# Patient Record
Sex: FEMALE | Race: WHITE | NOT HISPANIC OR LATINO | URBAN - METROPOLITAN AREA
[De-identification: names, ages, dates, MRNs, and addresses within clinical notes are randomized per-mention and may not be internally consistent; named-entity substitution may affect disease eponyms.]

---

## 2023-01-05 ENCOUNTER — EMERGENCY (EMERGENCY)
Facility: HOSPITAL | Age: 88
LOS: 0 days | Discharge: LEFT BEFORE DISCHARGE | End: 2023-01-05
Attending: EMERGENCY MEDICINE | Admitting: EMERGENCY MEDICINE
Payer: MEDICARE

## 2023-01-05 VITALS
TEMPERATURE: 99 F | WEIGHT: 89.95 LBS | OXYGEN SATURATION: 98 % | RESPIRATION RATE: 17 BRPM | DIASTOLIC BLOOD PRESSURE: 66 MMHG | SYSTOLIC BLOOD PRESSURE: 116 MMHG

## 2023-01-05 VITALS
SYSTOLIC BLOOD PRESSURE: 171 MMHG | DIASTOLIC BLOOD PRESSURE: 62 MMHG | HEART RATE: 65 BPM | TEMPERATURE: 98 F | RESPIRATION RATE: 18 BRPM

## 2023-01-05 DIAGNOSIS — Z20.822 CONTACT WITH AND (SUSPECTED) EXPOSURE TO COVID-19: ICD-10-CM

## 2023-01-05 DIAGNOSIS — Z85.51 PERSONAL HISTORY OF MALIGNANT NEOPLASM OF BLADDER: ICD-10-CM

## 2023-01-05 DIAGNOSIS — Z90.49 ACQUIRED ABSENCE OF OTHER SPECIFIED PARTS OF DIGESTIVE TRACT: Chronic | ICD-10-CM

## 2023-01-05 DIAGNOSIS — R30.0 DYSURIA: ICD-10-CM

## 2023-01-05 DIAGNOSIS — Z53.29 PROCEDURE AND TREATMENT NOT CARRIED OUT BECAUSE OF PATIENT'S DECISION FOR OTHER REASONS: ICD-10-CM

## 2023-01-05 DIAGNOSIS — N39.0 URINARY TRACT INFECTION, SITE NOT SPECIFIED: ICD-10-CM

## 2023-01-05 DIAGNOSIS — Z95.2 PRESENCE OF PROSTHETIC HEART VALVE: Chronic | ICD-10-CM

## 2023-01-05 DIAGNOSIS — Z88.0 ALLERGY STATUS TO PENICILLIN: ICD-10-CM

## 2023-01-05 DIAGNOSIS — R35.0 FREQUENCY OF MICTURITION: ICD-10-CM

## 2023-01-05 DIAGNOSIS — N13.2 HYDRONEPHROSIS WITH RENAL AND URETERAL CALCULOUS OBSTRUCTION: ICD-10-CM

## 2023-01-05 DIAGNOSIS — Z95.4 PRESENCE OF OTHER HEART-VALVE REPLACEMENT: ICD-10-CM

## 2023-01-05 LAB
ALBUMIN SERPL ELPH-MCNC: 4.4 G/DL — SIGNIFICANT CHANGE UP (ref 3.5–5.2)
ALP SERPL-CCNC: 84 U/L — SIGNIFICANT CHANGE UP (ref 30–115)
ALT FLD-CCNC: 15 U/L — SIGNIFICANT CHANGE UP (ref 0–41)
ANION GAP SERPL CALC-SCNC: 9 MMOL/L — SIGNIFICANT CHANGE UP (ref 7–14)
APPEARANCE UR: CLEAR — SIGNIFICANT CHANGE UP
AST SERPL-CCNC: 44 U/L — HIGH (ref 0–41)
BACTERIA # UR AUTO: ABNORMAL
BASOPHILS # BLD AUTO: 0.04 K/UL — SIGNIFICANT CHANGE UP (ref 0–0.2)
BASOPHILS NFR BLD AUTO: 0.6 % — SIGNIFICANT CHANGE UP (ref 0–1)
BILIRUB SERPL-MCNC: 0.4 MG/DL — SIGNIFICANT CHANGE UP (ref 0.2–1.2)
BILIRUB UR-MCNC: NEGATIVE — SIGNIFICANT CHANGE UP
BUN SERPL-MCNC: 25 MG/DL — HIGH (ref 10–20)
CALCIUM SERPL-MCNC: 9.8 MG/DL — SIGNIFICANT CHANGE UP (ref 8.4–10.5)
CHLORIDE SERPL-SCNC: 104 MMOL/L — SIGNIFICANT CHANGE UP (ref 98–110)
CO2 SERPL-SCNC: 25 MMOL/L — SIGNIFICANT CHANGE UP (ref 17–32)
COD CRY URNS QL: NEGATIVE — SIGNIFICANT CHANGE UP
COLOR SPEC: YELLOW — SIGNIFICANT CHANGE UP
CREAT SERPL-MCNC: 0.8 MG/DL — SIGNIFICANT CHANGE UP (ref 0.7–1.5)
DIFF PNL FLD: ABNORMAL
EGFR: 66 ML/MIN/1.73M2 — SIGNIFICANT CHANGE UP
EOSINOPHIL # BLD AUTO: 0.01 K/UL — SIGNIFICANT CHANGE UP (ref 0–0.7)
EOSINOPHIL NFR BLD AUTO: 0.1 % — SIGNIFICANT CHANGE UP (ref 0–8)
EPI CELLS # UR: ABNORMAL /HPF
GLUCOSE SERPL-MCNC: 106 MG/DL — HIGH (ref 70–99)
GLUCOSE UR QL: NEGATIVE MG/DL — SIGNIFICANT CHANGE UP
GRAN CASTS # UR COMP ASSIST: NEGATIVE — SIGNIFICANT CHANGE UP
HCT VFR BLD CALC: 39.8 % — SIGNIFICANT CHANGE UP (ref 37–47)
HGB BLD-MCNC: 13.5 G/DL — SIGNIFICANT CHANGE UP (ref 12–16)
HYALINE CASTS # UR AUTO: NEGATIVE — SIGNIFICANT CHANGE UP
IMM GRANULOCYTES NFR BLD AUTO: 0.1 % — SIGNIFICANT CHANGE UP (ref 0.1–0.3)
KETONES UR-MCNC: NEGATIVE — SIGNIFICANT CHANGE UP
LACTATE SERPL-SCNC: 1.3 MMOL/L — SIGNIFICANT CHANGE UP (ref 0.7–2)
LEUKOCYTE ESTERASE UR-ACNC: NEGATIVE — SIGNIFICANT CHANGE UP
LYMPHOCYTES # BLD AUTO: 1.07 K/UL — LOW (ref 1.2–3.4)
LYMPHOCYTES # BLD AUTO: 15.6 % — LOW (ref 20.5–51.1)
MCHC RBC-ENTMCNC: 32.3 PG — HIGH (ref 27–31)
MCHC RBC-ENTMCNC: 33.9 G/DL — SIGNIFICANT CHANGE UP (ref 32–37)
MCV RBC AUTO: 95.2 FL — SIGNIFICANT CHANGE UP (ref 81–99)
MONOCYTES # BLD AUTO: 0.51 K/UL — SIGNIFICANT CHANGE UP (ref 0.1–0.6)
MONOCYTES NFR BLD AUTO: 7.4 % — SIGNIFICANT CHANGE UP (ref 1.7–9.3)
NEUTROPHILS # BLD AUTO: 5.21 K/UL — SIGNIFICANT CHANGE UP (ref 1.4–6.5)
NEUTROPHILS NFR BLD AUTO: 76.2 % — HIGH (ref 42.2–75.2)
NITRITE UR-MCNC: NEGATIVE — SIGNIFICANT CHANGE UP
NRBC # BLD: 0 /100 WBCS — SIGNIFICANT CHANGE UP (ref 0–0)
PH UR: 5.5 — SIGNIFICANT CHANGE UP (ref 5–8)
PLATELET # BLD AUTO: 152 K/UL — SIGNIFICANT CHANGE UP (ref 130–400)
POTASSIUM SERPL-MCNC: 5.6 MMOL/L — HIGH (ref 3.5–5)
POTASSIUM SERPL-SCNC: 5.6 MMOL/L — HIGH (ref 3.5–5)
PROT SERPL-MCNC: 7.3 G/DL — SIGNIFICANT CHANGE UP (ref 6–8)
PROT UR-MCNC: 30 MG/DL
RBC # BLD: 4.18 M/UL — LOW (ref 4.2–5.4)
RBC # FLD: 12.6 % — SIGNIFICANT CHANGE UP (ref 11.5–14.5)
RBC CASTS # UR COMP ASSIST: ABNORMAL /HPF
SARS-COV-2 RNA SPEC QL NAA+PROBE: SIGNIFICANT CHANGE UP
SODIUM SERPL-SCNC: 138 MMOL/L — SIGNIFICANT CHANGE UP (ref 135–146)
SP GR SPEC: >=1.03 (ref 1.01–1.03)
TRI-PHOS CRY UR QL COMP ASSIST: NEGATIVE — SIGNIFICANT CHANGE UP
URATE CRY FLD QL MICRO: NEGATIVE — SIGNIFICANT CHANGE UP
UROBILINOGEN FLD QL: 0.2 MG/DL — SIGNIFICANT CHANGE UP
WBC # BLD: 6.85 K/UL — SIGNIFICANT CHANGE UP (ref 4.8–10.8)
WBC # FLD AUTO: 6.85 K/UL — SIGNIFICANT CHANGE UP (ref 4.8–10.8)
WBC UR QL: ABNORMAL /HPF

## 2023-01-05 PROCEDURE — 99285 EMERGENCY DEPT VISIT HI MDM: CPT

## 2023-01-05 PROCEDURE — 74176 CT ABD & PELVIS W/O CONTRAST: CPT | Mod: 26,MA

## 2023-01-05 RX ORDER — CEFPODOXIME PROXETIL 100 MG
1 TABLET ORAL
Qty: 14 | Refills: 0
Start: 2023-01-05 | End: 2023-01-11

## 2023-01-05 NOTE — ED PROVIDER NOTE - CONSIDERATION OF ADMISSION OBSERVATION
Consideration of Admission/Observation Due to the patient's advanced age with possible UTI and possible renal stone considered admission.

## 2023-01-05 NOTE — ED PROVIDER NOTE - PATIENT PORTAL LINK FT
You can access the FollowMyHealth Patient Portal offered by [facility] by registering at the following website: [email or web address]. By joining [employer]’s FollowMyHealth portal, you will also be able to view your health information using other applications (apps) compatible with our system.

## 2023-01-05 NOTE — ED PROVIDER NOTE - CARE PROVIDERS DIRECT ADDRESSES
[email or web address]

## 2023-01-05 NOTE — ED ADULT NURSE NOTE - OBJECTIVE STATEMENT
as per pts son, "for three weeks shes been having frequent urination and burning. she was placed on two different antibiotics by PMD, I don't think they are working. she has bladder cancer so I don't know if this is just a uti or if something is progressing"

## 2023-01-05 NOTE — ED PROVIDER NOTE - ATTENDING CONTRIBUTION TO CARE
89-year-old female with a known history of bladder cancer not being aggressively treated with VAC placement here evaluation of 3 weeks of urinary frequency dysuria.  Patient was initially treated with nitrofurantoin followed by fosfomycin for presumed UTI which was never actually diagnosed via urine sample rather clinically was diagnosed.  Patient presents with urinary symptoms with any fever chills.  The patient does endorse vague bilateral flank pain agrees above exam  Impression  Patient here with urinary symptoms suggestive of UTI labs showing no elevated white count with urinalysis demonstrating negative leukocytes and nitrites however there were moderate bacteria with associated epithelial cells white blood cells and red blood cells.  CT scan demonstrated right renal pelvis stone as well as mild right hydronephrosis.  There is concern that the right renal pelvis stone might have slid into the ureter causing hydronephrosis with associated possible UTI.  Due to the fact the patient was symptomatic partially treated given course of antibiotics urine culture sent.  Patient left against medical vice prior to urology evaluation and/or admission.

## 2023-01-05 NOTE — ED PROVIDER NOTE - NSFOLLOWUPINSTRUCTIONS_ED_ALL_ED_FT
Our Emergency Department Referral Coordinators will be reaching out ot you in the next 24-48 hours from 9:00am to 5:00pm (Monday to Friday) with a follow up appointment. Please expect a phone call from the hospital in that time frame. If you do not receive a call or if you have any questions or concerns, you can reach them at [phone] or [phone].      Urinary Tract Infection, Adult  A urinary tract infection (UTI) is an infection of any part of the urinary tract, which includes the kidneys, ureters, bladder, and urethra. These organs make, store, and get rid of urine in the body. UTI can be a bladder infection (cystitis) or kidney infection (pyelonephritis).    What are the causes?  This infection may be caused by fungi, viruses, or bacteria. Bacteria are the most common cause of UTIs. This condition can also be caused by repeated incomplete emptying of the bladder during urination.    What increases the risk?  This condition is more likely to develop if:    You ignore your need to urinate or hold urine for long periods of time.  You do not empty your bladder completely during urination.  You wipe back to front after urinating or having a bowel movement, if you are female.  You are uncircumcised, if you are male.  You are constipated.  You have a urinary catheter that stays in place (indwelling).  You have a weak defense (immune) system.  You have a medical condition that affects your bowels, kidneys, or bladder.  You have diabetes.  You take antibiotic medicines frequently or for long periods of time, and the antibiotics no longer work well against certain types of infections (antibiotic resistance).  You take medicines that irritate your urinary tract.  You are exposed to chemicals that irritate your urinary tract.  You are female.    What are the signs or symptoms?  Symptoms of this condition include:    Fever.  Frequent urination or passing small amounts of urine frequently.  Needing to urinate urgently.  Pain or burning with urination.  Urine that smells bad or unusual.  Cloudy urine.  Pain in the lower abdomen or back.  Trouble urinating.  Blood in the urine.  Vomiting or being less hungry than normal.  Diarrhea or abdominal pain.  Vaginal discharge, if you are female.    How is this diagnosed?  This condition is diagnosed with a medical history and physical exam. You will also need to provide a urine sample to test your urine. Other tests may be done, including:    Blood tests.  Sexually transmitted disease (STD) testing.    If you have had more than one UTI, a cystoscopy or imaging studies may be done to determine the cause of the infections.    How is this treated?  Treatment for this condition often includes a combination of two or more of the following:    Antibiotic medicine.  Other medicines to treat less common causes of UTI.  Over-the-counter medicines to treat pain.  Drinking enough water to stay hydrated.    Follow these instructions at home:  Take over-the-counter and prescription medicines only as told by your health care provider.  If you were prescribed an antibiotic, take it as told by your health care provider. Do not stop taking the antibiotic even if you start to feel better.  Avoid alcohol, caffeine, tea, and carbonated beverages. They can irritate your bladder.  Drink enough fluid to keep your urine clear or pale yellow.  Keep all follow-up visits as told by your health care provider. This is important.  ImageMake sure to:    Empty your bladder often and completely. Do not hold urine for long periods of time.  Empty your bladder before and after sex.  Wipe from front to back after a bowel movement if you are female. Use each tissue one time when you wipe.    Contact a health care provider if:  You have back pain.  You have a fever.  You feel nauseous or vomit.  Your symptoms do not get better after 3 days.  Your symptoms go away and then return.  Get help right away if:  You have severe back pain or lower abdominal pain.  You are vomiting and cannot keep down any medicines or water.  This information is not intended to replace advice given to you by your health care provider. Make sure you discuss any questions you have with your health care provider.        Kidney Stones    Kidney stones (urolithiasis) are deposits that form inside your kidneys. The intense pain is caused by the stone moving through the urinary tract. When the stone moves, the ureter goes into spasm around the stone. The stone is usually passed in the urine. Symptoms include abdominal, side, or back pain, nausea, vomiting, blood in the urine, frequency with urination. Drink enough water and fluids to keep your urine clear or pale yellow. This will help you to pass the stone or stone fragments.    SEEK IMMEDIATE MEDICAL CARE IF YOU HAVE THE FOLLOWING SYMPTOMS: pain not controlled with medication, fever/chills, worsening vomiting, inability to urinate, or dizziness/lightheadedness.

## 2023-01-05 NOTE — ED PROVIDER NOTE - OBJECTIVE STATEMENT
89F w/ pmhx of hard of hearing, bladder cancer (found in 2021 but has opted for no management due to age), aortic valve replacement, otherwise no other pmhx, who present with 3 weeks of urinary frequency and dysuria. she is coming from [facility] assisted living with her son at bedside. 3 weeks ago she began having sx, her pmd treated her with nitrofuratoin but she did not tolerate it due to nausea/vomting, and then with fosfomycin twice. urine was never checked, diagnosis was clinical. she continues to have sxs. she's also had a lot of unintentional weight loss of the years as well per son. the bladder cancer has not been monitored due to age. has some b/l flank pain. no f c n v cp sob.

## 2023-01-05 NOTE — ED PROVIDER NOTE - NS ED ROS FT
Constitutional: No fever   Eyes:  No visual changes  Ears:  No hearing changes  Neck: No neck pain  Cardiac:  No chest pain  Respiratory:  No SOB   GI:  No abdominal pain, nausea, or vomiting  :  +dysuria +frequency  MS:  No back pain  Neuro:  No headache or weakness.  No LOC  Skin:  No skin rash

## 2023-01-05 NOTE — ED PROVIDER NOTE - CARE PROVIDER_API CALL
Francisca Zarate)  Urology  [address] [place] [address], [address]  [place] [address]  Phone: [phone]  Fax: [phone]  Follow Up Time: 4-6 Days

## 2023-01-05 NOTE — ED ADULT NURSE NOTE - NSFALLRSKPASTHIST_ED_ALL_ED
FYWB- medial branch block, pain diary    After injection:  Have someone available to drive you home and stay with you for about 4 hours or until you have no weakness or numbness  in your limbs (which may happen from local anesthetic and may last for a few hours).  Usually we use local anesthetic only without sedation. If you have IV sedation, do not drive or sign legal documents for 24 hours.  Your blood pressure may go up temporarily for several days following cortisone injection and should come back down on its own.   If you are diabetic, your blood sugar may go up temporarily for several days following cortisone injection and should come back down on its own. If your blood sugar goes above 300, you should contact your primary care physician or your endocrinologist who is managing your diabetes.  If you have severe congestive heart failure, the cortisone may worsen the condition temporarily.  If you do develop allergic reaction to any of the medications, it could be skin rash and/or itching and you may take a Benadryl 25 mg tablet, which is over the counter. If that does not help or if you develop any sensation of swelling in the tongue or throat or difficulty in breathing, you should seek immediate medical attention either through Urgent Care or Emergency Room, or call 911.   Be aware of possible side effects including, but not limited to transient increase in pain, infection, headache, nausea, vomiting, weakness and delayed adverse medication effects.    Discharge Medication Instructions Post Pain Procedure:  If you were on any of the following blood thinning medications:  · Not applicable.  I have not changed any of your self-reported or prescribed medications except as above. If you have questions regarding these medications, please contact the provider who prescribed each medication.  In case you have questions, call the [facility] program at [phone].    Shanda MYERS    
no

## 2023-01-05 NOTE — ED PROVIDER NOTE - PHYSICAL EXAMINATION
CONSTITUTIONAL: in no acute distress  SKIN: warm, dry  HEAD: Normocephalic  EYES: no conjunctival erythema  ENT: no nasal discharge, airway clear  NECK: full ROM  CARD: regular rate and rhythm  RESP: normal respiratory effort, no wheezes, rales or rhonchi  ABD: soft, non-distended, non-tender no cva tenderness  EXT: moving all extremities spontaneously  NEURO: alert and oriented, grossly unremarkable

## 2023-01-06 LAB
CULTURE RESULTS: SIGNIFICANT CHANGE UP
SPECIMEN SOURCE: SIGNIFICANT CHANGE UP

## 2023-01-29 ENCOUNTER — INPATIENT (INPATIENT)
Facility: HOSPITAL | Age: 88
LOS: 4 days | Discharge: HOME HEALTH | End: 2023-02-03
Attending: INTERNAL MEDICINE | Admitting: INTERNAL MEDICINE
Payer: MEDICARE

## 2023-01-29 VITALS
SYSTOLIC BLOOD PRESSURE: 134 MMHG | OXYGEN SATURATION: 97 % | HEART RATE: 85 BPM | TEMPERATURE: 99 F | RESPIRATION RATE: 18 BRPM | DIASTOLIC BLOOD PRESSURE: 82 MMHG

## 2023-01-29 DIAGNOSIS — Z95.2 PRESENCE OF PROSTHETIC HEART VALVE: Chronic | ICD-10-CM

## 2023-01-29 DIAGNOSIS — Z90.49 ACQUIRED ABSENCE OF OTHER SPECIFIED PARTS OF DIGESTIVE TRACT: Chronic | ICD-10-CM

## 2023-01-29 PROBLEM — H91.90 UNSPECIFIED HEARING LOSS, UNSPECIFIED EAR: Chronic | Status: ACTIVE | Noted: 2023-01-05

## 2023-01-29 PROBLEM — C67.9 MALIGNANT NEOPLASM OF BLADDER, UNSPECIFIED: Chronic | Status: ACTIVE | Noted: 2023-01-05

## 2023-01-29 LAB
ALBUMIN SERPL ELPH-MCNC: 4 G/DL — SIGNIFICANT CHANGE UP (ref 3.5–5.2)
ALP SERPL-CCNC: 87 U/L — SIGNIFICANT CHANGE UP (ref 30–115)
ALT FLD-CCNC: 12 U/L — SIGNIFICANT CHANGE UP (ref 0–41)
ANION GAP SERPL CALC-SCNC: 10 MMOL/L — SIGNIFICANT CHANGE UP (ref 7–14)
APPEARANCE UR: ABNORMAL
AST SERPL-CCNC: 25 U/L — SIGNIFICANT CHANGE UP (ref 0–41)
BACTERIA # UR AUTO: NEGATIVE — SIGNIFICANT CHANGE UP
BASOPHILS # BLD AUTO: 0.04 K/UL — SIGNIFICANT CHANGE UP (ref 0–0.2)
BASOPHILS NFR BLD AUTO: 0.7 % — SIGNIFICANT CHANGE UP (ref 0–1)
BILIRUB SERPL-MCNC: 0.4 MG/DL — SIGNIFICANT CHANGE UP (ref 0.2–1.2)
BILIRUB UR-MCNC: NEGATIVE — SIGNIFICANT CHANGE UP
BUN SERPL-MCNC: 25 MG/DL — HIGH (ref 10–20)
CALCIUM SERPL-MCNC: 9.4 MG/DL — SIGNIFICANT CHANGE UP (ref 8.4–10.5)
CHLORIDE SERPL-SCNC: 106 MMOL/L — SIGNIFICANT CHANGE UP (ref 98–110)
CO2 SERPL-SCNC: 23 MMOL/L — SIGNIFICANT CHANGE UP (ref 17–32)
COLOR SPEC: ABNORMAL
CREAT SERPL-MCNC: 0.7 MG/DL — SIGNIFICANT CHANGE UP (ref 0.7–1.5)
DIFF PNL FLD: ABNORMAL
EGFR: 78 ML/MIN/1.73M2 — SIGNIFICANT CHANGE UP
EOSINOPHIL # BLD AUTO: 0.07 K/UL — SIGNIFICANT CHANGE UP (ref 0–0.7)
EOSINOPHIL NFR BLD AUTO: 1.3 % — SIGNIFICANT CHANGE UP (ref 0–8)
EPI CELLS # UR: 2 /HPF — SIGNIFICANT CHANGE UP (ref 0–5)
GLUCOSE SERPL-MCNC: 71 MG/DL — SIGNIFICANT CHANGE UP (ref 70–99)
GLUCOSE UR QL: NEGATIVE — SIGNIFICANT CHANGE UP
HCT VFR BLD CALC: 35.2 % — LOW (ref 37–47)
HGB BLD-MCNC: 12 G/DL — SIGNIFICANT CHANGE UP (ref 12–16)
HYALINE CASTS # UR AUTO: 1 /LPF — SIGNIFICANT CHANGE UP (ref 0–7)
IMM GRANULOCYTES NFR BLD AUTO: 0.2 % — SIGNIFICANT CHANGE UP (ref 0.1–0.3)
KETONES UR-MCNC: NEGATIVE — SIGNIFICANT CHANGE UP
LACTATE SERPL-SCNC: 1.2 MMOL/L — SIGNIFICANT CHANGE UP (ref 0.7–2)
LEUKOCYTE ESTERASE UR-ACNC: ABNORMAL
LIDOCAIN IGE QN: 37 U/L — SIGNIFICANT CHANGE UP (ref 7–60)
LYMPHOCYTES # BLD AUTO: 0.81 K/UL — LOW (ref 1.2–3.4)
LYMPHOCYTES # BLD AUTO: 14.8 % — LOW (ref 20.5–51.1)
MCHC RBC-ENTMCNC: 31.9 PG — HIGH (ref 27–31)
MCHC RBC-ENTMCNC: 34.1 G/DL — SIGNIFICANT CHANGE UP (ref 32–37)
MCV RBC AUTO: 93.6 FL — SIGNIFICANT CHANGE UP (ref 81–99)
MONOCYTES # BLD AUTO: 0.43 K/UL — SIGNIFICANT CHANGE UP (ref 0.1–0.6)
MONOCYTES NFR BLD AUTO: 7.8 % — SIGNIFICANT CHANGE UP (ref 1.7–9.3)
NEUTROPHILS # BLD AUTO: 4.12 K/UL — SIGNIFICANT CHANGE UP (ref 1.4–6.5)
NEUTROPHILS NFR BLD AUTO: 75.2 % — SIGNIFICANT CHANGE UP (ref 42.2–75.2)
NITRITE UR-MCNC: NEGATIVE — SIGNIFICANT CHANGE UP
NRBC # BLD: 0 /100 WBCS — SIGNIFICANT CHANGE UP (ref 0–0)
PH UR: 6.5 — SIGNIFICANT CHANGE UP (ref 5–8)
PLATELET # BLD AUTO: 149 K/UL — SIGNIFICANT CHANGE UP (ref 130–400)
POTASSIUM SERPL-MCNC: 4 MMOL/L — SIGNIFICANT CHANGE UP (ref 3.5–5)
POTASSIUM SERPL-SCNC: 4 MMOL/L — SIGNIFICANT CHANGE UP (ref 3.5–5)
PROT SERPL-MCNC: 6.1 G/DL — SIGNIFICANT CHANGE UP (ref 6–8)
PROT UR-MCNC: ABNORMAL
RBC # BLD: 3.76 M/UL — LOW (ref 4.2–5.4)
RBC # FLD: 12.6 % — SIGNIFICANT CHANGE UP (ref 11.5–14.5)
RBC CASTS # UR COMP ASSIST: >720 /HPF — HIGH (ref 0–4)
SARS-COV-2 RNA SPEC QL NAA+PROBE: DETECTED
SODIUM SERPL-SCNC: 139 MMOL/L — SIGNIFICANT CHANGE UP (ref 135–146)
SP GR SPEC: 1.01 — SIGNIFICANT CHANGE UP (ref 1.01–1.03)
UROBILINOGEN FLD QL: SIGNIFICANT CHANGE UP
WBC # BLD: 5.48 K/UL — SIGNIFICANT CHANGE UP (ref 4.8–10.8)
WBC # FLD AUTO: 5.48 K/UL — SIGNIFICANT CHANGE UP (ref 4.8–10.8)
WBC UR QL: 12 /HPF — HIGH (ref 0–5)

## 2023-01-29 PROCEDURE — 74177 CT ABD & PELVIS W/CONTRAST: CPT | Mod: 26,MA

## 2023-01-29 PROCEDURE — 99285 EMERGENCY DEPT VISIT HI MDM: CPT | Mod: CS

## 2023-01-29 PROCEDURE — 70450 CT HEAD/BRAIN W/O DYE: CPT | Mod: 26,MA

## 2023-01-29 RX ORDER — AZTREONAM 2 G
2000 VIAL (EA) INJECTION EVERY 6 HOURS
Refills: 0 | Status: DISCONTINUED | OUTPATIENT
Start: 2023-01-29 | End: 2023-01-31

## 2023-01-29 RX ORDER — ENOXAPARIN SODIUM 100 MG/ML
40 INJECTION SUBCUTANEOUS EVERY 24 HOURS
Refills: 0 | Status: DISCONTINUED | OUTPATIENT
Start: 2023-01-29 | End: 2023-02-03

## 2023-01-29 RX ORDER — SODIUM CHLORIDE 9 MG/ML
1000 INJECTION, SOLUTION INTRAVENOUS
Refills: 0 | Status: DISCONTINUED | OUTPATIENT
Start: 2023-01-29 | End: 2023-02-02

## 2023-01-29 RX ORDER — SODIUM CHLORIDE 9 MG/ML
1000 INJECTION INTRAMUSCULAR; INTRAVENOUS; SUBCUTANEOUS ONCE
Refills: 0 | Status: COMPLETED | OUTPATIENT
Start: 2023-01-29 | End: 2023-01-29

## 2023-01-29 RX ORDER — CIPROFLOXACIN LACTATE 400MG/40ML
250 VIAL (ML) INTRAVENOUS EVERY 12 HOURS
Refills: 0 | Status: DISCONTINUED | OUTPATIENT
Start: 2023-01-29 | End: 2023-01-30

## 2023-01-29 RX ADMIN — SODIUM CHLORIDE 1000 MILLILITER(S): 9 INJECTION INTRAMUSCULAR; INTRAVENOUS; SUBCUTANEOUS at 17:22

## 2023-01-29 RX ADMIN — SODIUM CHLORIDE 1000 MILLILITER(S): 9 INJECTION INTRAMUSCULAR; INTRAVENOUS; SUBCUTANEOUS at 12:41

## 2023-01-29 RX ADMIN — Medication 100 MILLIGRAM(S): at 19:25

## 2023-01-29 RX ADMIN — Medication 250 MILLIGRAM(S): at 20:57

## 2023-01-29 RX ADMIN — SODIUM CHLORIDE 50 MILLILITER(S): 9 INJECTION, SOLUTION INTRAVENOUS at 20:54

## 2023-01-29 RX ADMIN — ENOXAPARIN SODIUM 40 MILLIGRAM(S): 100 INJECTION SUBCUTANEOUS at 22:09

## 2023-01-29 NOTE — H&P ADULT - ASSESSMENT
89-year-old male with a past medical history significant for bladder cancer (no intervention as per family), lewy body dementia?, aortic valve replacement, who presents with change in mental status.    #Mild confusion/hallucinations likely on chronic dementia exacerbated by dehydration/UTI  -pt has had these episodes of confusion/hallucinations in the past, son states that he was told last year that she has lewy body dementia  -currently AAO2 to name and date, otherwise no focal deficits  -c/w IV hydration  -Cipro for UTI (complaining of dysuria, UA really not that impressive and no sirs but will treat)  -f/u urine cx  -CT head neg, chronic microvasular changes  -can consider neuro eval     DVT ppx: lovenox  Diet: reg   Dispo: [place] [facility]

## 2023-01-29 NOTE — ED ADULT NURSE NOTE - OBJECTIVE STATEMENT
89 y/o female alert oriented x 3 presented to ed for ams and possible dehydration . Patient is agitated , son at bedside . Sepsis workup completed . Off unit to CT

## 2023-01-29 NOTE — H&P ADULT - ATTENDING COMMENTS
89 yr old female presented with agitation.  VITAL SIGNS (Last 24 hrs):  T(C): 36.9 (01-30-23 @ 00:29), Max: 36.9 (01-30-23 @ 00:29)  HR: 62 (01-30-23 @ 07:44) (62 - 95)  BP: 140/60 (01-30-23 @ 07:44) (140/60 - 165/69)  RR: 20 (01-30-23 @ 07:44) (19 - 20)  SpO2: 98% (01-30-23 @ 07:44) (98% - 98%)  Wt(kg): --  Daily     Daily     I&O's Summary                        13.8   6.43  )-----------( 175      ( 30 Jan 2023 06:38 )             39.9   01-30    144  |  108  |  21<H>  ----------------------------<  110<H>  4.2   |  23  |  0.8    Ca    9.5      30 Jan 2023 06:38  Mg     1.9     01-30    TPro  6.2  /  Alb  4.1  /  TBili  0.5  /  DBili  x   /  AST  26  /  ALT  12  /  AlkPhos  94  01-30  o/e  awake but disoriented  chest-- b/l air entry   cvs--s1s2n  abd-- soft  Assessment and plan:  #  metabolic encephalopathy-- sec to UTI-- on Aztreonam-- urine and blood cx is pending  # Covid  PNA-- started on RDV as per ID  # Hx of Lewy body dementia-- haldol IM prn-- can start zyprexa-- if she takes PO  called  son armand-- he said she is DNR and DNI and son will bring her papers in AM. Please call him if anything happens overnight as MOLST form is not signed.

## 2023-01-29 NOTE — H&P ADULT - HISTORY OF PRESENT ILLNESS
89-year-old male with a past medical history significant for bladder cancer (no intervention as per family), lewy body dementia?, aortic valve replacement, who presents with change in mental status. Son states that over the last few days she has been somewhat more confused, imagining that she has been talking with people that aren't there. Son states that she has had this in the past when she's had UTI's and been dehydrated. She has also been complaining of dysuria.  As per son, he states that last year when she was admitted to [facility] for a UTI they did some head imaging at told him that she had lewy body dementia.     In the ED, pt given aztreonam, fluids  Vitals: Vital Signs Last 24 Hrs  T(C): 36.9 (29 Jan 2023 16:40), Max: 37.1 (29 Jan 2023 11:29)  T(F): 98.5 (29 Jan 2023 16:40), Max: 98.7 (29 Jan 2023 11:29)  HR: 85 (29 Jan 2023 16:40) (85 - 85)  BP: 134/82 (29 Jan 2023 11:29) (134/82 - 134/82)  BP(mean): --  RR: 18 (29 Jan 2023 16:40) (18 - 18)  SpO2: 96% (29 Jan 2023 16:40) (96% - 97%)    Parameters below as of 29 Jan 2023 11:29  Patient On (Oxygen Delivery Method): room air    Labs: UA with 12 wbc,few LE  Imaging:  < from: CT Abdomen and Pelvis w/ IV Cont (01.29.23 @ 14:33) >  MPRESSION:      Previously noted 4 mm calculus in the right renal pelvis is no longer seen    No evidence of hydroureter or hydronephrosis    Bilateral nonobstructing renal calculi measuring up to 3 mm    Large amount of stool in the colon suspicious for constipation    < end of copied text >  < from: CT Head No Cont (01.29.23 @ 13:04) >  IMPRESSION:  No evidence of acute transcortical infarct, acute intracranial   hemorrhage, or mass effect.    < end of copied text >

## 2023-01-29 NOTE — ED ADULT TRIAGE NOTE - CHIEF COMPLAINT QUOTE
pt BIBA from home due to patient not being herself as per son due to suspicions of dehydration. BS 74 in triage

## 2023-01-29 NOTE — ED PROVIDER NOTE - PHYSICAL EXAMINATION
VITAL SIGNS: I have reviewed nursing notes and confirm.  CONSTITUTIONAL: non-toxic, well appearing  SKIN: no rash, no petechiae.  EYES:  EOMI, pink conjunctiva, anicteric  ENT: tongue midline, no exudates, MMM  NECK: Supple; no meningismus, no JVD  CARD: RRR, no murmurs, equal radial pulses bilaterally 2+  RESP: CTAB, no respiratory distress  ABD: Soft, non-tender, non-distended, no peritoneal signs, no HSM, no CVA tenderness  EXT: Normal ROM x4. No edema. No calves tenderness  NEURO: Alert, oriented x 2 (person and place)

## 2023-01-29 NOTE — ED PROVIDER NOTE - CLINICAL SUMMARY MEDICAL DECISION MAKING FREE TEXT BOX
89 yr old f who presents with a change in mental status. Labs and EKG were ordered and reviewed.  Imaging was ordered and reviewed by me.  Appropriate medications for patient's presenting complaints were ordered and effects were reassessed.  Patient's records (prior hospital, ED visit, and/or nursing home notes if available) were reviewed.  Additional history was obtained from EMS, family, and/or PCP (where available).  Escalation to admission/observation was considered.  Patient requires inpatient hospitalization, due to UTI and change in mental status. pt admitted to medicine for further evaluation.

## 2023-01-29 NOTE — H&P ADULT - NSHPPHYSICALEXAM_GEN_ALL_CORE
GENERAL: NAD, lying in bed comfortably  HEAD:  Atraumatic, Normocephalic  EYES: conjunctiva and sclera clear  ENT: Moist mucous membranes  NECK: Supple, No JVD  CHEST/LUNG: Clear to auscultation bilaterally; No rales, rhonchi, wheezing, or rubs. Unlabored respirations  HEART: Regular rate and rhythm; No murmurs, rubs, or gallops  ABDOMEN: Soft, nontender, nondistended  EXTREMITIES:  No clubbing, cyanosis, or edema  NERVOUS SYSTEM:  A&Ox2 to name and date, otherwise no focal deficits

## 2023-01-29 NOTE — ED ADULT TRIAGE NOTE - CCCP TRG CHIEF CMPLNT
Please bring in a copy of your advance directive at your next visit, or drop off a copy at any [facility] facility so that it can be added to your medical record.      see chief complaint quote

## 2023-01-29 NOTE — ED PROVIDER NOTE - OBJECTIVE STATEMENT
89-year-old male with a past medical history significant for bladder cancer (no intervention as per family), lewy body dementia ?, aortic valve replacement, who presents with change in mental status. As per son, pt was recently seen at [facility] noted to have a kidney stone and UTI. Pt discharged with cipro. However, as per son, pt has been worsening symptoms, including hallucinations. Pt complains of lower abd pain, however, denies any other medical complaints.

## 2023-01-30 LAB
ALBUMIN SERPL ELPH-MCNC: 4.1 G/DL — SIGNIFICANT CHANGE UP (ref 3.5–5.2)
ALP SERPL-CCNC: 94 U/L — SIGNIFICANT CHANGE UP (ref 30–115)
ALT FLD-CCNC: 12 U/L — SIGNIFICANT CHANGE UP (ref 0–41)
ANION GAP SERPL CALC-SCNC: 13 MMOL/L — SIGNIFICANT CHANGE UP (ref 7–14)
AST SERPL-CCNC: 26 U/L — SIGNIFICANT CHANGE UP (ref 0–41)
BASOPHILS # BLD AUTO: 0.03 K/UL — SIGNIFICANT CHANGE UP (ref 0–0.2)
BASOPHILS NFR BLD AUTO: 0.5 % — SIGNIFICANT CHANGE UP (ref 0–1)
BILIRUB SERPL-MCNC: 0.5 MG/DL — SIGNIFICANT CHANGE UP (ref 0.2–1.2)
BUN SERPL-MCNC: 21 MG/DL — HIGH (ref 10–20)
CALCIUM SERPL-MCNC: 9.5 MG/DL — SIGNIFICANT CHANGE UP (ref 8.4–10.5)
CHLORIDE SERPL-SCNC: 108 MMOL/L — SIGNIFICANT CHANGE UP (ref 98–110)
CO2 SERPL-SCNC: 23 MMOL/L — SIGNIFICANT CHANGE UP (ref 17–32)
CREAT SERPL-MCNC: 0.8 MG/DL — SIGNIFICANT CHANGE UP (ref 0.7–1.5)
CULTURE RESULTS: SIGNIFICANT CHANGE UP
EGFR: 66 ML/MIN/1.73M2 — SIGNIFICANT CHANGE UP
EOSINOPHIL # BLD AUTO: 0.09 K/UL — SIGNIFICANT CHANGE UP (ref 0–0.7)
EOSINOPHIL NFR BLD AUTO: 1.4 % — SIGNIFICANT CHANGE UP (ref 0–8)
GLUCOSE SERPL-MCNC: 110 MG/DL — HIGH (ref 70–99)
HCT VFR BLD CALC: 39.9 % — SIGNIFICANT CHANGE UP (ref 37–47)
HGB BLD-MCNC: 13.8 G/DL — SIGNIFICANT CHANGE UP (ref 12–16)
IMM GRANULOCYTES NFR BLD AUTO: 0.3 % — SIGNIFICANT CHANGE UP (ref 0.1–0.3)
LYMPHOCYTES # BLD AUTO: 0.77 K/UL — LOW (ref 1.2–3.4)
LYMPHOCYTES # BLD AUTO: 12 % — LOW (ref 20.5–51.1)
MAGNESIUM SERPL-MCNC: 1.9 MG/DL — SIGNIFICANT CHANGE UP (ref 1.8–2.4)
MCHC RBC-ENTMCNC: 32.2 PG — HIGH (ref 27–31)
MCHC RBC-ENTMCNC: 34.6 G/DL — SIGNIFICANT CHANGE UP (ref 32–37)
MCV RBC AUTO: 93 FL — SIGNIFICANT CHANGE UP (ref 81–99)
MONOCYTES # BLD AUTO: 0.54 K/UL — SIGNIFICANT CHANGE UP (ref 0.1–0.6)
MONOCYTES NFR BLD AUTO: 8.4 % — SIGNIFICANT CHANGE UP (ref 1.7–9.3)
NEUTROPHILS # BLD AUTO: 4.98 K/UL — SIGNIFICANT CHANGE UP (ref 1.4–6.5)
NEUTROPHILS NFR BLD AUTO: 77.4 % — HIGH (ref 42.2–75.2)
NRBC # BLD: 0 /100 WBCS — SIGNIFICANT CHANGE UP (ref 0–0)
PLATELET # BLD AUTO: 175 K/UL — SIGNIFICANT CHANGE UP (ref 130–400)
POTASSIUM SERPL-MCNC: 4.2 MMOL/L — SIGNIFICANT CHANGE UP (ref 3.5–5)
POTASSIUM SERPL-SCNC: 4.2 MMOL/L — SIGNIFICANT CHANGE UP (ref 3.5–5)
PROT SERPL-MCNC: 6.2 G/DL — SIGNIFICANT CHANGE UP (ref 6–8)
RBC # BLD: 4.29 M/UL — SIGNIFICANT CHANGE UP (ref 4.2–5.4)
RBC # FLD: 12.6 % — SIGNIFICANT CHANGE UP (ref 11.5–14.5)
SODIUM SERPL-SCNC: 144 MMOL/L — SIGNIFICANT CHANGE UP (ref 135–146)
SPECIMEN SOURCE: SIGNIFICANT CHANGE UP
WBC # BLD: 6.43 K/UL — SIGNIFICANT CHANGE UP (ref 4.8–10.8)
WBC # FLD AUTO: 6.43 K/UL — SIGNIFICANT CHANGE UP (ref 4.8–10.8)

## 2023-01-30 PROCEDURE — 99221 1ST HOSP IP/OBS SF/LOW 40: CPT

## 2023-01-30 PROCEDURE — 99497 ADVNCD CARE PLAN 30 MIN: CPT | Mod: 25

## 2023-01-30 RX ORDER — HALOPERIDOL DECANOATE 100 MG/ML
2 INJECTION INTRAMUSCULAR ONCE
Refills: 0 | Status: COMPLETED | OUTPATIENT
Start: 2023-01-30 | End: 2023-01-30

## 2023-01-30 RX ORDER — OLANZAPINE 15 MG/1
2.5 TABLET, FILM COATED ORAL DAILY
Refills: 0 | Status: DISCONTINUED | OUTPATIENT
Start: 2023-01-30 | End: 2023-02-01

## 2023-01-30 RX ORDER — REMDESIVIR 5 MG/ML
100 INJECTION INTRAVENOUS EVERY 24 HOURS
Refills: 0 | Status: COMPLETED | OUTPATIENT
Start: 2023-01-31 | End: 2023-02-01

## 2023-01-30 RX ORDER — QUETIAPINE FUMARATE 200 MG/1
25 TABLET, FILM COATED ORAL ONCE
Refills: 0 | Status: COMPLETED | OUTPATIENT
Start: 2023-01-30 | End: 2023-01-30

## 2023-01-30 RX ORDER — HALOPERIDOL DECANOATE 100 MG/ML
2 INJECTION INTRAMUSCULAR EVERY 6 HOURS
Refills: 0 | Status: DISCONTINUED | OUTPATIENT
Start: 2023-01-30 | End: 2023-02-02

## 2023-01-30 RX ORDER — REMDESIVIR 5 MG/ML
200 INJECTION INTRAVENOUS EVERY 24 HOURS
Refills: 0 | Status: COMPLETED | OUTPATIENT
Start: 2023-01-30 | End: 2023-01-30

## 2023-01-30 RX ORDER — HALOPERIDOL DECANOATE 100 MG/ML
1 INJECTION INTRAMUSCULAR ONCE
Refills: 0 | Status: DISCONTINUED | OUTPATIENT
Start: 2023-01-30 | End: 2023-01-30

## 2023-01-30 RX ADMIN — QUETIAPINE FUMARATE 25 MILLIGRAM(S): 200 TABLET, FILM COATED ORAL at 00:11

## 2023-01-30 RX ADMIN — Medication 100 MILLIGRAM(S): at 16:59

## 2023-01-30 RX ADMIN — Medication 100 MILLIGRAM(S): at 03:32

## 2023-01-30 RX ADMIN — Medication 250 MILLIGRAM(S): at 05:50

## 2023-01-30 RX ADMIN — QUETIAPINE FUMARATE 25 MILLIGRAM(S): 200 TABLET, FILM COATED ORAL at 07:54

## 2023-01-30 RX ADMIN — Medication 100 MILLIGRAM(S): at 07:57

## 2023-01-30 RX ADMIN — REMDESIVIR 200 MILLIGRAM(S): 5 INJECTION INTRAVENOUS at 20:50

## 2023-01-30 RX ADMIN — HALOPERIDOL DECANOATE 2 MILLIGRAM(S): 100 INJECTION INTRAMUSCULAR at 09:57

## 2023-01-30 RX ADMIN — Medication 100 MILLIGRAM(S): at 12:01

## 2023-01-30 NOTE — CONSULT NOTE ADULT - ASSESSMENT
ASSESSMENT  89y F admitted with URINARY TRACT INFECTION    HPI:  89-year-old female with a past medical history significant for bladder cancer (no intervention as per family), lewy body dementia?, aortic valve replacement, who presents with change in mental status. Son states that over the last few days she has been somewhat more confused, imagining that she has been talking with people that aren't there. Son states that she has had this in the past when she's had UTI's and been dehydrated. She has also been complaining of dysuria.  As per son, he states that last year when she was admitted to [facility] in [place] for a UTI they did some head imaging at told him that she had lewy body dementia.     IMPRESSION  #COVID19 in pt with Aortic V Replacement, bladder CA, ?Lewy body dementia, AMS  SpO2: 96% (29 Jan 2023 16:40) (96% - 97%)      #R/O UTI.    U/a  Nitrite: Negative /Leuk Esterase: Small / RBC: >720 /HPF / WBC 12 /HPF  / Bacteria: Negative  CT with bilateral nonobstructing renal calculi measuring up to 3 mm. Large amount of stool in the colon suspicious for constipation.    On aztreonam  IVPB 2000 milliGRAM(s) IV Intermittent every 6 hours  ciprofloxacin     Tablet 250 milliGRAM(s) Oral every 12 hours  Abx allergy: penicillin (Unknown)    Creatinine, Serum: 0.7 (01-29-23 @ 12:40)      RECOMMENDATIONS  - Remdesivir D1: 200mg x1, Day 2 - Day 3 100mg IV daily. Monitor Cr/LFTs  - Order Cxray  - Await Urine C&S   - Continue Aztreonam  - D/C Cipro  - Consult Neurology if confusion does not improve

## 2023-01-30 NOTE — CONSULT NOTE ADULT - SUBJECTIVE AND OBJECTIVE BOX
TEVIN REGAN  89y, Female  Allergy: penicillin (Unknown)      CHIEF COMPLAINT:       LOS  1d    HPI  HPI:  89-year-old male with a past medical history significant for bladder cancer (no intervention as per family), lewy body dementia?, aortic valve replacement, who presents with change in mental status. Son states that over the last few days she has been somewhat more confused, imagining that she has been talking with people that aren't there. Son states that she has had this in the past when she's had UTI's and been dehydrated. She has also been complaining of dysuria.  As per son, he states that last year when she was admitted to [facility] in [place] for a UTI they did some head imaging at told him that she had lewy body dementia.     In the ED, pt given aztreonam, fluids  Vitals: Vital Signs Last 24 Hrs  T(C): 36.9 (2023 16:40), Max: 37.1 (2023 11:29)  T(F): 98.5 (2023 16:40), Max: 98.7 (2023 11:29)  HR: 85 (2023 16:40) (85 - 85)  BP: 134/82 (2023 11:29) (134/82 - 134/82)  BP(mean): --  RR: 18 (2023 16:40) (18 - 18)  SpO2: 96% (2023 16:40) (96% - 97%)    Parameters below as of 2023 11:29  Patient On (Oxygen Delivery Method): room air    Labs: UA with 12 wbc,few LE  Imaging:  < from: CT Abdomen and Pelvis w/ IV Cont (23 @ 14:33) >  MPRESSION:      Previously noted 4 mm calculus in the right renal pelvis is no longer seen    No evidence of hydroureter or hydronephrosis    Bilateral nonobstructing renal calculi measuring up to 3 mm    Large amount of stool in the colon suspicious for constipation    < end of copied text >  < from: CT Head No Cont (23 @ 13:04) >  IMPRESSION:  No evidence of acute transcortical infarct, acute intracranial   hemorrhage, or mass effect.    < end of copied text >   (2023 18:02)          PMH  PAST MEDICAL & SURGICAL HISTORY:  Bladder cancer      Hard of hearing      History of bowel resection      Heart valve replaced          FAMILY HISTORY      SOCIAL HISTORY  Social History:        ROS  unable to obtain    VITALS:  T(F): 98.4, Max: 98.7 (23 @ 11:29)  HR: 95  BP: 165/69  RR: 19Vital Signs Last 24 Hrs  T(C): 36.9 (2023 00:29), Max: 37.1 (2023 11:29)  T(F): 98.4 (2023 00:29), Max: 98.7 (2023 11:29)  HR: 95 (2023 00:29) (85 - 95)  BP: 165/69 (2023 00:29) (134/82 - 165/69)  BP(mean): --  RR: 19 (2023 00:29) (18 - 19)  SpO2: 98% (2023 00:29) (96% - 98%)    Parameters below as of 2023 00:29  Patient On (Oxygen Delivery Method): room air        PHYSICAL EXAM:  HEENT WNL  Cor RSR   Lungs clear  Abd nontender    TESTS & MEASUREMENTS:                        12.0   5.48  )-----------( 149      ( 2023 12:40 )             35.2         139  |  106  |  25<H>  ----------------------------<  71  4.0   |  23  |  0.7    Ca    9.4      2023 12:40    TPro  6.1  /  Alb  4.0  /  TBili  0.4  /  DBili  x   /  AST  25  /  ALT  12  /  AlkPhos  87        LIVER FUNCTIONS - ( 2023 12:40 )  Alb: 4.0 g/dL / Pro: 6.1 g/dL / ALK PHOS: 87 U/L / ALT: 12 U/L / AST: 25 U/L / GGT: x           Urinalysis Basic - ( 2023 14:52 )    Color: Light Orange / Appearance: Slightly Turbid / S.015 / pH: x  Gluc: x / Ketone: Negative  / Bili: Negative / Urobili: <2 mg/dL   Blood: x / Protein: 30 mg/dL / Nitrite: Negative   Leuk Esterase: Small / RBC: >720 /HPF / WBC 12 /HPF   Sq Epi: x / Non Sq Epi: 2 /HPF / Bacteria: Negative        Culture - Urine (collected 23 @ 15:50)  Source: Clean Catch Clean Catch (Midstream)  Final Report (23 @ 20:43):    <10,000 CFU/mL Normal Urogenital Lucrecia        Lactate, Blood: 1.2 mmol/L (23 @ 12:40)      INFECTIOUS DISEASES TESTING  COVID-19 PCR: Detected (23 @ 13:41)  COVID-19 PCR: NotDetec (23 @ 15:13)      INFLAMMATORY MARKERS      RADIOLOGY & ADDITIONAL TESTS:  I have personally reviewed the last Chest xray  CXR      CT  CT Abdomen and Pelvis w/ IV Cont:   ACC: [id number] EXAM:  CT ABDOMEN AND PELVIS IC   ORDERED BY: AKIN BALDERRAMA     PROCEDURE DATE:  2023          INTERPRETATION:  REASON FOR EXAM / CLINICAL STATEMENT: Failure to thrive.      TECHNIQUE:  Contiguous axial CT images were obtained from the diaphragms   through the pubic symphysis with IV contrast.  Reformatted images in the   coronal and sagittal planes were acquired.      COMPARISON CT: 2023      TUBES AND LINES: None.    LOWER CHEST: Cardiomegaly. Post TAVR. There are reticular opacities at   the lung bases. No pleural or pericardial effusion.    HEPATIC: The liver is normal in size with no evidence of solid mass or   bile duct dilatation. Hepatic cysts are noted.    BILIARY: Post cholecystectomy    SPLEEN: Unremarkable.    PANCREAS: The pancreas is normal in size and configuration. No evidence   of mass or pancreatitis.    ADRENAL GLANDS: Mild thickening of both adrenals    KIDNEYS: Previous noted stone in the right renal pelvis is no longer   seen.. There are several nonobstructing bilateral renal calculi measuring   up to 3 mm. No evidence of left hydronephrosis.    ABDOMINOPELVIC NODES: Unremarkable.    PELVIC ORGANS: No evidence of pelvic mass, lymphadenopathy, or fluid   collection.    BLADDER: The bladder is nondistended limiting evaluation. No bulky masses   are identified.    PERITONEUM/MESENTERY/BOWEL: Large amount of stool is noted throughout the   colon. No pneumoperitoneum.  The appendix is non definitively identified    BONES/SOFT TISSUES: Osteopenia.   Degenerative changes of the spine are   noted.    OTHER: Aortoiliac calcifications are noted with no evidence of abdominal   aortic aneurysm.      IMPRESSION:      Previously noted 4 mm calculus in the right renal pelvis is no longer seen    No evidence of hydroureter or hydronephrosis    Bilateral nonobstructing renal calculi measuring up to 3 mm    Large amount of stool in the colon suspicious for constipation    --- End of Report ---            CUONG MORGAN MD; Attending Radiologist  This document has been electronically signed. 2023  3:07PM (23 @ 14:33)      CARDIOLOGY TESTING      MEDICATIONS  aztreonam  IVPB 2000 IV Intermittent every 6 hours  ciprofloxacin     Tablet 250 Oral every 12 hours  enoxaparin Injectable 40 SubCutaneous every 24 hours  lactated ringers. 1000 IV Continuous <Continuous>  QUEtiapine 25 Oral once      Weight  Weight (kg): 40.8 (23 @ 18:44)    ANTIBIOTICS:  aztreonam  IVPB 2000 milliGRAM(s) IV Intermittent every 6 hours  ciprofloxacin     Tablet 250 milliGRAM(s) Oral every 12 hours      ALLERGIES:  penicillin (Unknown)

## 2023-01-30 NOTE — ED ADULT NURSE REASSESSMENT NOTE - NS ED NURSE REASSESS COMMENT FT1
Patient is confused, A&Ox0. Patient reoriented. MD Obrien at bedside. Pt denies any chest pain or discomfort. No signs of distress noted.

## 2023-01-31 LAB
BASOPHILS # BLD AUTO: 0.03 K/UL — SIGNIFICANT CHANGE UP (ref 0–0.2)
BASOPHILS NFR BLD AUTO: 0.7 % — SIGNIFICANT CHANGE UP (ref 0–1)
EOSINOPHIL # BLD AUTO: 0.13 K/UL — SIGNIFICANT CHANGE UP (ref 0–0.7)
EOSINOPHIL NFR BLD AUTO: 3.1 % — SIGNIFICANT CHANGE UP (ref 0–8)
HCT VFR BLD CALC: 37.5 % — SIGNIFICANT CHANGE UP (ref 37–47)
HGB BLD-MCNC: 12.8 G/DL — SIGNIFICANT CHANGE UP (ref 12–16)
IMM GRANULOCYTES NFR BLD AUTO: 0.2 % — SIGNIFICANT CHANGE UP (ref 0.1–0.3)
LYMPHOCYTES # BLD AUTO: 0.81 K/UL — LOW (ref 1.2–3.4)
LYMPHOCYTES # BLD AUTO: 19.5 % — LOW (ref 20.5–51.1)
MCHC RBC-ENTMCNC: 32.3 PG — HIGH (ref 27–31)
MCHC RBC-ENTMCNC: 34.1 G/DL — SIGNIFICANT CHANGE UP (ref 32–37)
MCV RBC AUTO: 94.7 FL — SIGNIFICANT CHANGE UP (ref 81–99)
MONOCYTES # BLD AUTO: 0.29 K/UL — SIGNIFICANT CHANGE UP (ref 0.1–0.6)
MONOCYTES NFR BLD AUTO: 7 % — SIGNIFICANT CHANGE UP (ref 1.7–9.3)
NEUTROPHILS # BLD AUTO: 2.88 K/UL — SIGNIFICANT CHANGE UP (ref 1.4–6.5)
NEUTROPHILS NFR BLD AUTO: 69.5 % — SIGNIFICANT CHANGE UP (ref 42.2–75.2)
NRBC # BLD: 0 /100 WBCS — SIGNIFICANT CHANGE UP (ref 0–0)
PLATELET # BLD AUTO: 162 K/UL — SIGNIFICANT CHANGE UP (ref 130–400)
RBC # BLD: 3.96 M/UL — LOW (ref 4.2–5.4)
RBC # FLD: 12.6 % — SIGNIFICANT CHANGE UP (ref 11.5–14.5)
WBC # BLD: 4.15 K/UL — LOW (ref 4.8–10.8)
WBC # FLD AUTO: 4.15 K/UL — LOW (ref 4.8–10.8)

## 2023-01-31 PROCEDURE — 93010 ELECTROCARDIOGRAM REPORT: CPT

## 2023-01-31 PROCEDURE — 99232 SBSQ HOSP IP/OBS MODERATE 35: CPT

## 2023-01-31 PROCEDURE — 71045 X-RAY EXAM CHEST 1 VIEW: CPT | Mod: 26

## 2023-01-31 RX ORDER — ACETAMINOPHEN 500 MG
650 TABLET ORAL EVERY 6 HOURS
Refills: 0 | Status: DISCONTINUED | OUTPATIENT
Start: 2023-01-31 | End: 2023-02-03

## 2023-01-31 RX ORDER — SODIUM CHLORIDE 9 MG/ML
500 INJECTION, SOLUTION INTRAVENOUS ONCE
Refills: 0 | Status: COMPLETED | OUTPATIENT
Start: 2023-01-31 | End: 2023-02-01

## 2023-01-31 RX ORDER — SODIUM CHLORIDE 9 MG/ML
500 INJECTION, SOLUTION INTRAVENOUS ONCE
Refills: 0 | Status: COMPLETED | OUTPATIENT
Start: 2023-01-31 | End: 2023-01-31

## 2023-01-31 RX ORDER — OXYCODONE HYDROCHLORIDE 5 MG/1
2.5 TABLET ORAL EVERY 4 HOURS
Refills: 0 | Status: DISCONTINUED | OUTPATIENT
Start: 2023-01-31 | End: 2023-02-02

## 2023-01-31 RX ORDER — POLYETHYLENE GLYCOL 3350 17 G/17G
17 POWDER, FOR SOLUTION ORAL
Refills: 0 | Status: DISCONTINUED | OUTPATIENT
Start: 2023-01-31 | End: 2023-02-03

## 2023-01-31 RX ORDER — SENNA PLUS 8.6 MG/1
2 TABLET ORAL AT BEDTIME
Refills: 0 | Status: DISCONTINUED | OUTPATIENT
Start: 2023-01-31 | End: 2023-02-03

## 2023-01-31 RX ORDER — POLYETHYLENE GLYCOL 3350 17 G/17G
17 POWDER, FOR SOLUTION ORAL ONCE
Refills: 0 | Status: COMPLETED | OUTPATIENT
Start: 2023-01-31 | End: 2023-01-31

## 2023-01-31 RX ADMIN — Medication 650 MILLIGRAM(S): at 12:41

## 2023-01-31 RX ADMIN — SODIUM CHLORIDE 500 MILLILITER(S): 9 INJECTION, SOLUTION INTRAVENOUS at 22:25

## 2023-01-31 RX ADMIN — OLANZAPINE 2.5 MILLIGRAM(S): 15 TABLET, FILM COATED ORAL at 12:24

## 2023-01-31 RX ADMIN — ENOXAPARIN SODIUM 40 MILLIGRAM(S): 100 INJECTION SUBCUTANEOUS at 21:57

## 2023-01-31 RX ADMIN — Medication 100 MILLIGRAM(S): at 06:43

## 2023-01-31 RX ADMIN — POLYETHYLENE GLYCOL 3350 17 GRAM(S): 17 POWDER, FOR SOLUTION ORAL at 17:04

## 2023-01-31 RX ADMIN — Medication 100 MILLIGRAM(S): at 00:00

## 2023-01-31 RX ADMIN — REMDESIVIR 200 MILLIGRAM(S): 5 INJECTION INTRAVENOUS at 12:24

## 2023-01-31 RX ADMIN — SENNA PLUS 2 TABLET(S): 8.6 TABLET ORAL at 21:57

## 2023-01-31 RX ADMIN — Medication 650 MILLIGRAM(S): at 10:45

## 2023-01-31 RX ADMIN — POLYETHYLENE GLYCOL 3350 17 GRAM(S): 17 POWDER, FOR SOLUTION ORAL at 10:46

## 2023-01-31 NOTE — PHYSICAL THERAPY INITIAL EVALUATION ADULT - PERTINENT HX OF CURRENT PROBLEM, REHAB EVAL
89-year-old male with a past medical history significant for bladder cancer (no intervention as per family), lewy body dementia?, aortic valve replacement, who presents with change in mental status.   Pt. referred to PT for eval and tx.

## 2023-01-31 NOTE — PROGRESS NOTE ADULT - SUBJECTIVE AND OBJECTIVE BOX
TEVIN REGAN  89y  [facility]-N ER Hold 056 A      Patient is a 89y old  Female who presents with a chief complaint of COVID (30 Jan 2023 07:30)      INTERVAL HPI/OVERNIGHT EVENTS:        REVIEW OF SYSTEMS:        FAMILY HISTORY:    T(C): 36.6 (01-31-23 @ 00:52), Max: 36.6 (01-31-23 @ 00:52)  HR: 79 (01-31-23 @ 00:52) (64 - 79)  BP: 135/59 (01-31-23 @ 00:52) (135/59 - 145/65)  RR: 18 (01-31-23 @ 00:52) (18 - 18)  SpO2: 96% (01-31-23 @ 00:52) (96% - 99%)  Wt(kg): --Vital Signs Last 24 Hrs  T(C): 36.6 (31 Jan 2023 00:52), Max: 36.6 (31 Jan 2023 00:52)  T(F): 97.9 (31 Jan 2023 00:52), Max: 97.9 (31 Jan 2023 00:52)  HR: 79 (31 Jan 2023 00:52) (64 - 79)  BP: 135/59 (31 Jan 2023 00:52) (135/59 - 145/65)  BP(mean): --  RR: 18 (31 Jan 2023 00:52) (18 - 18)  SpO2: 96% (31 Jan 2023 00:52) (96% - 99%)    Parameters below as of 30 Jan 2023 17:15  Patient On (Oxygen Delivery Method): room air        PHYSICAL EXAM:  GENERAL: NAD, well-groomed, well-developed  HEAD:  Atraumatic, Normocephalic  EYES: EOMI, PERRLA, conjunctiva and sclera clear  ENMT: No tonsillar erythema, exudates, or enlargement; Moist mucous membranes, Good dentition, No lesions  NECK: Supple, No JVD, Normal thyroid  NERVOUS SYSTEM:  Alert & Oriented X3, Good concentration; Motor Strength 5/5 B/L upper and lower extremities; DTRs 2+ intact and symmetric  PULM: Clear to auscultation bilaterally  CARDIAC: Regular rate and rhythm; No murmurs, rubs, or gallops  GI: Soft, Nontender, Nondistended; Bowel sounds present  EXTREMITIES:  2+ Peripheral Pulses, No clubbing, cyanosis, or edema  LYMPH: No lymphadenopathy noted  SKIN: No rashes or lesions    Consultant(s) Notes Reviewed:  [x ] YES  [ ] NO  Care Discussed with Consultants/Other Providers [ x] YES  [ ] NO    LABS:                            13.8   6.43  )-----------( 175      ( 30 Jan 2023 06:38 )             39.9   01-30    144  |  108  |  21<H>  ----------------------------<  110<H>  4.2   |  23  |  0.8    Ca    9.5      30 Jan 2023 06:38  Mg     1.9     01-30    TPro  6.2  /  Alb  4.1  /  TBili  0.5  /  DBili  x   /  AST  26  /  ALT  12  /  AlkPhos  94  01-30            Culture - Urine (collected 29 Jan 2023 14:52)  Source: Clean Catch Clean Catch (Midstream)  Final Report (30 Jan 2023 20:45):    <10,000 CFU/mL Normal Urogenital Henrique    Culture - Blood (collected 29 Jan 2023 12:40)  Source: .Blood Blood-Peripheral  Preliminary Report (30 Jan 2023 22:03):    No growth to date.    Culture - Blood (collected 29 Jan 2023 12:40)  Source: .Blood Blood-Peripheral  Preliminary Report (30 Jan 2023 22:03):    No growth to date.      aztreonam  IVPB 2000 milliGRAM(s) IV Intermittent every 6 hours  enoxaparin Injectable 40 milliGRAM(s) SubCutaneous every 24 hours  haloperidol    Injectable 2 milliGRAM(s) IntraMuscular every 6 hours PRN  lactated ringers. 1000 milliLiter(s) IV Continuous <Continuous>  OLANZapine 2.5 milliGRAM(s) Oral daily  remdesivir  IVPB 100 milliGRAM(s) IV Intermittent every 24 hours      89-year-old male with a past medical history significant for bladder cancer (no intervention as per family), lewy body dementia?, aortic valve replacement, who presents with change in mental status.    1. Metabolic encephalopathy secondary to COVID . UTI was ruled out  hx of dementia   * pt has had these episodes of confusion/hallucinations in the past, son states that he was told last year that she has lewy body dementia  * currently AAO2 to name and date, otherwise no focal deficits  - Admit to medicine         - CTH:WNL   - Was on Aztreonam that will be stopped   - Urine cx:nl henrique   - Was on IVF     2. Hx of bladder cancer   - No intervention as per family     3. Aortic valve replacement   - not on aspirin     DVT ppx: lovenox  Diet: reg   Dispo: from [facility]           Case Discussed with House Staff   39  minutes spent on total encounter; more than 50% of the visit was spent counseling and/or coordinating care by the attending physician.   Spectra [phone]     REGAN ABARCA  89y  [facility]-N ER Hold 056 A      Patient is a 89y old  Female who presents with a chief complaint of COVID (30 Jan 2023 07:30)      INTERVAL HPI/OVERNIGHT EVENTS:  patient is alert and awake today. ambulating with PT  she is complaining of tooth pain that seems gonna fall .dental consult ordered               FAMILY HISTORY:    T(C): 36.6 (01-31-23 @ 00:52), Max: 36.6 (01-31-23 @ 00:52)  HR: 79 (01-31-23 @ 00:52) (64 - 79)  BP: 135/59 (01-31-23 @ 00:52) (135/59 - 145/65)  RR: 18 (01-31-23 @ 00:52) (18 - 18)  SpO2: 96% (01-31-23 @ 00:52) (96% - 99%)  Wt(kg): --Vital Signs Last 24 Hrs  T(C): 36.6 (31 Jan 2023 00:52), Max: 36.6 (31 Jan 2023 00:52)  T(F): 97.9 (31 Jan 2023 00:52), Max: 97.9 (31 Jan 2023 00:52)  HR: 79 (31 Jan 2023 00:52) (64 - 79)  BP: 135/59 (31 Jan 2023 00:52) (135/59 - 145/65)  BP(mean): --  RR: 18 (31 Jan 2023 00:52) (18 - 18)  SpO2: 96% (31 Jan 2023 00:52) (96% - 99%)    Parameters below as of 30 Jan 2023 17:15  Patient On (Oxygen Delivery Method): room air        PHYSICAL EXAM:  GENERAL: NAD, well-groomed, well-developed  Dental : 2-3 tooth loose with tenderness   NERVOUS SYSTEM:  Alert & Oriented X3, Good concentration; Motor Strength 5/5 B/L upper and lower extremities; DTRs 2+ intact and symmetric  PULM: Clear to auscultation bilaterally  CARDIAC: Regular rate and rhythm; No murmurs, rubs, or gallops  GI: Soft, Nontender, Nondistended; Bowel sounds present  EXTREMITIES:  2+ Peripheral Pulses, No clubbing, cyanosis, or edema      Consultant(s) Notes Reviewed:  [x ] YES  [ ] NO  Care Discussed with Consultants/Other Providers [ x] YES  [ ] NO    LABS:                            13.8   6.43  )-----------( 175      ( 30 Jan 2023 06:38 )             39.9   01-30    144  |  108  |  21<H>  ----------------------------<  110<H>  4.2   |  23  |  0.8    Ca    9.5      30 Jan 2023 06:38  Mg     1.9     01-30    TPro  6.2  /  Alb  4.1  /  TBili  0.5  /  DBili  x   /  AST  26  /  ALT  12  /  AlkPhos  94  01-30            Culture - Urine (collected 29 Jan 2023 14:52)  Source: Clean Catch Clean Catch (Midstream)  Final Report (30 Jan 2023 20:45):    <10,000 CFU/mL Normal Urogenital Henrique    Culture - Blood (collected 29 Jan 2023 12:40)  Source: .Blood Blood-Peripheral  Preliminary Report (30 Jan 2023 22:03):    No growth to date.    Culture - Blood (collected 29 Jan 2023 12:40)  Source: .Blood Blood-Peripheral  Preliminary Report (30 Jan 2023 22:03):    No growth to date.      aztreonam  IVPB 2000 milliGRAM(s) IV Intermittent every 6 hours  enoxaparin Injectable 40 milliGRAM(s) SubCutaneous every 24 hours  haloperidol    Injectable 2 milliGRAM(s) IntraMuscular every 6 hours PRN  lactated ringers. 1000 milliLiter(s) IV Continuous <Continuous>  OLANZapine 2.5 milliGRAM(s) Oral daily  remdesivir  IVPB 100 milliGRAM(s) IV Intermittent every 24 hours      89-year-old male with a past medical history significant for bladder cancer (no intervention as per family), lewy body dementia?, aortic valve replacement, who presents with change in mental status.    1. Metabolic encephalopathy secondary to COVID . UTI was ruled out  hx of dementia   * pt has had these episodes of confusion/hallucinations in the past, son states that he was told last year that she has lewy body dementia  * currently AAO2 to name and date, otherwise no focal deficits  - Admit to medicine         - CTH:WNL   - Was on Aztreonam that will be stopped   - Urine cx:nl henrique   - Was on IVF   - PT eval appeciated     2. Hx of bladder cancer   - No intervention as per family     3. Aortic valve replacement   - not on aspirin     DVT ppx: lovenox  Diet: reg   Dispo: from [facility]       Anticipate for dc in am     Case Discussed with House Staff   Spectra [phone]     REGAN ABARCA  89y  [facility]-N ER Hold 056 A      Patient is a 89y old  Female who presents with a chief complaint of COVID (30 Jan 2023 07:30)      INTERVAL HPI/OVERNIGHT EVENTS:  patient is alert and awake today. ambulating with PT  she is complaining of tooth pain that seems gonna fall .dental consult ordered               FAMILY HISTORY:    T(C): 36.6 (01-31-23 @ 00:52), Max: 36.6 (01-31-23 @ 00:52)  HR: 79 (01-31-23 @ 00:52) (64 - 79)  BP: 135/59 (01-31-23 @ 00:52) (135/59 - 145/65)  RR: 18 (01-31-23 @ 00:52) (18 - 18)  SpO2: 96% (01-31-23 @ 00:52) (96% - 99%)  Wt(kg): --Vital Signs Last 24 Hrs  T(C): 36.6 (31 Jan 2023 00:52), Max: 36.6 (31 Jan 2023 00:52)  T(F): 97.9 (31 Jan 2023 00:52), Max: 97.9 (31 Jan 2023 00:52)  HR: 79 (31 Jan 2023 00:52) (64 - 79)  BP: 135/59 (31 Jan 2023 00:52) (135/59 - 145/65)  BP(mean): --  RR: 18 (31 Jan 2023 00:52) (18 - 18)  SpO2: 96% (31 Jan 2023 00:52) (96% - 99%)    Parameters below as of 30 Jan 2023 17:15  Patient On (Oxygen Delivery Method): room air        PHYSICAL EXAM:  GENERAL: NAD, well-groomed, well-developed  Dental : 2-3 tooth loose with tenderness   NERVOUS SYSTEM:  Alert & Oriented X3, Good concentration; Motor Strength 5/5 B/L upper and lower extremities; DTRs 2+ intact and symmetric  PULM: Clear to auscultation bilaterally  CARDIAC: Regular rate and rhythm; No murmurs, rubs, or gallops  GI: Soft, Nontender, Nondistended; Bowel sounds present  EXTREMITIES:  2+ Peripheral Pulses, No clubbing, cyanosis, or edema      Consultant(s) Notes Reviewed:  [x ] YES  [ ] NO  Care Discussed with Consultants/Other Providers [ x] YES  [ ] NO    LABS:                            13.8   6.43  )-----------( 175      ( 30 Jan 2023 06:38 )             39.9   01-30    144  |  108  |  21<H>  ----------------------------<  110<H>  4.2   |  23  |  0.8    Ca    9.5      30 Jan 2023 06:38  Mg     1.9     01-30    TPro  6.2  /  Alb  4.1  /  TBili  0.5  /  DBili  x   /  AST  26  /  ALT  12  /  AlkPhos  94  01-30            Culture - Urine (collected 29 Jan 2023 14:52)  Source: Clean Catch Clean Catch (Midstream)  Final Report (30 Jan 2023 20:45):    <10,000 CFU/mL Normal Urogenital Henrique    Culture - Blood (collected 29 Jan 2023 12:40)  Source: .Blood Blood-Peripheral  Preliminary Report (30 Jan 2023 22:03):    No growth to date.    Culture - Blood (collected 29 Jan 2023 12:40)  Source: .Blood Blood-Peripheral  Preliminary Report (30 Jan 2023 22:03):    No growth to date.      aztreonam  IVPB 2000 milliGRAM(s) IV Intermittent every 6 hours  enoxaparin Injectable 40 milliGRAM(s) SubCutaneous every 24 hours  haloperidol    Injectable 2 milliGRAM(s) IntraMuscular every 6 hours PRN  lactated ringers. 1000 milliLiter(s) IV Continuous <Continuous>  OLANZapine 2.5 milliGRAM(s) Oral daily  remdesivir  IVPB 100 milliGRAM(s) IV Intermittent every 24 hours      89-year-old male with a past medical history significant for bladder cancer (no intervention as per family), lewy body dementia?, aortic valve replacement, who presents with change in mental status.    1. Metabolic encephalopathy secondary to COVID . UTI was ruled out  hx of dementia   * pt has had these episodes of confusion/hallucinations in the past, son states that he was told last year that she has lewy body dementia  * currently AAO2 to name and date, otherwise no focal deficits  - Admit to medicine         - CTH:WNL   - Was on Aztreonam that will be stopped   - Urine cx:nl henrique   - Keep gentle hydration  - CRP;pending    - PT eval appeciated     2. Hx of bladder cancer   - No intervention as per family     3. Aortic valve replacement   - not on aspirin     DVT ppx: lovenox  Diet: reg   Dispo: from [facility]       Anticipate for dc in am     Case Discussed with House Staff   Spectra [phone]     REGAN ABARCA  89y  [facility]-N ER Hold 056 A      Patient is a 89y old  Female who presents with a chief complaint of COVID (30 Jan 2023 07:30)      INTERVAL HPI/OVERNIGHT EVENTS:  patient is alert and awake today. ambulating with PT  she is complaining of tooth pain that seems gonna fall .dental consult ordered               FAMILY HISTORY:    T(C): 36.6 (01-31-23 @ 00:52), Max: 36.6 (01-31-23 @ 00:52)  HR: 79 (01-31-23 @ 00:52) (64 - 79)  BP: 135/59 (01-31-23 @ 00:52) (135/59 - 145/65)  RR: 18 (01-31-23 @ 00:52) (18 - 18)  SpO2: 96% (01-31-23 @ 00:52) (96% - 99%)  Wt(kg): --Vital Signs Last 24 Hrs  T(C): 36.6 (31 Jan 2023 00:52), Max: 36.6 (31 Jan 2023 00:52)  T(F): 97.9 (31 Jan 2023 00:52), Max: 97.9 (31 Jan 2023 00:52)  HR: 79 (31 Jan 2023 00:52) (64 - 79)  BP: 135/59 (31 Jan 2023 00:52) (135/59 - 145/65)  BP(mean): --  RR: 18 (31 Jan 2023 00:52) (18 - 18)  SpO2: 96% (31 Jan 2023 00:52) (96% - 99%)    Parameters below as of 30 Jan 2023 17:15  Patient On (Oxygen Delivery Method): room air        PHYSICAL EXAM:  GENERAL: NAD, well-groomed, well-developed  Dental : 2-3 tooth loose with tenderness   NERVOUS SYSTEM:  Alert & Oriented X3, Good concentration; Motor Strength 5/5 B/L upper and lower extremities; DTRs 2+ intact and symmetric  PULM: Clear to auscultation bilaterally  CARDIAC: Regular rate and rhythm; No murmurs, rubs, or gallops  GI: Soft, Nontender, Nondistended; Bowel sounds present  EXTREMITIES:  2+ Peripheral Pulses, No clubbing, cyanosis, or edema      Consultant(s) Notes Reviewed:  [x ] YES  [ ] NO  Care Discussed with Consultants/Other Providers [ x] YES  [ ] NO    LABS:                            13.8   6.43  )-----------( 175      ( 30 Jan 2023 06:38 )             39.9   01-30    144  |  108  |  21<H>  ----------------------------<  110<H>  4.2   |  23  |  0.8    Ca    9.5      30 Jan 2023 06:38  Mg     1.9     01-30    TPro  6.2  /  Alb  4.1  /  TBili  0.5  /  DBili  x   /  AST  26  /  ALT  12  /  AlkPhos  94  01-30            Culture - Urine (collected 29 Jan 2023 14:52)  Source: Clean Catch Clean Catch (Midstream)  Final Report (30 Jan 2023 20:45):    <10,000 CFU/mL Normal Urogenital Henrique    Culture - Blood (collected 29 Jan 2023 12:40)  Source: .Blood Blood-Peripheral  Preliminary Report (30 Jan 2023 22:03):    No growth to date.    Culture - Blood (collected 29 Jan 2023 12:40)  Source: .Blood Blood-Peripheral  Preliminary Report (30 Jan 2023 22:03):    No growth to date.      aztreonam  IVPB 2000 milliGRAM(s) IV Intermittent every 6 hours  enoxaparin Injectable 40 milliGRAM(s) SubCutaneous every 24 hours  haloperidol    Injectable 2 milliGRAM(s) IntraMuscular every 6 hours PRN  lactated ringers. 1000 milliLiter(s) IV Continuous <Continuous>  OLANZapine 2.5 milliGRAM(s) Oral daily  remdesivir  IVPB 100 milliGRAM(s) IV Intermittent every 24 hours      89-year-old male with a past medical history significant for bladder cancer (no intervention as per family), lewy body dementia?, aortic valve replacement, who presents with change in mental status.    1. Metabolic encephalopathy secondary to COVID . UTI was ruled out  hx of dementia   * pt has had these episodes of confusion/hallucinations in the past, son states that he was told last year that she has lewy body dementia  * currently AAO2 to name and date, otherwise no focal deficits  - Admit to medicine         - CTH:WNL   - Was on Aztreonam that will be stopped   - Urine cx:nl henrique   - Keep gentle hydration  - CRP;pending    - PT eval appeciated     2. Hx of bladder cancer   - No intervention as per family     3. Aortic valve replacement   - not on aspirin     4. Constipation  - start laxative     DVT ppx: lovenox  Diet: reg   [facility]       Anticipate for dc in am     Case Discussed with House Staff   Spectra [phone]

## 2023-01-31 NOTE — PHYSICAL THERAPY INITIAL EVALUATION ADULT - NSPTDISCHREC_GEN_A_CORE
rehabilitation facility vs return to [facility] assisted living facility ( depending on pt's progress)

## 2023-01-31 NOTE — PATIENT PROFILE ADULT - FALL HARM RISK - HARM RISK INTERVENTIONS

## 2023-01-31 NOTE — PHYSICAL THERAPY INITIAL EVALUATION ADULT - GENERAL OBSERVATIONS, REHAB EVAL
[id number] Chart reviewed. Pt. seen semirecline in bed , in No apparent distress , + covid restrictions, IV meds ongoing , Pt. alert and oriented X 4 , Pt. agreed to activity/therapy.

## 2023-02-01 LAB
ALBUMIN SERPL ELPH-MCNC: 3.2 G/DL — LOW (ref 3.5–5.2)
ALBUMIN SERPL ELPH-MCNC: 3.2 G/DL — LOW (ref 3.5–5.2)
ALP SERPL-CCNC: 72 U/L — SIGNIFICANT CHANGE UP (ref 30–115)
ALP SERPL-CCNC: 73 U/L — SIGNIFICANT CHANGE UP (ref 30–115)
ALT FLD-CCNC: 10 U/L — SIGNIFICANT CHANGE UP (ref 0–41)
ALT FLD-CCNC: 11 U/L — SIGNIFICANT CHANGE UP (ref 0–41)
ANION GAP SERPL CALC-SCNC: 11 MMOL/L — SIGNIFICANT CHANGE UP (ref 7–14)
ANION GAP SERPL CALC-SCNC: 11 MMOL/L — SIGNIFICANT CHANGE UP (ref 7–14)
AST SERPL-CCNC: 24 U/L — SIGNIFICANT CHANGE UP (ref 0–41)
AST SERPL-CCNC: 29 U/L — SIGNIFICANT CHANGE UP (ref 0–41)
BASOPHILS # BLD AUTO: 0.03 K/UL — SIGNIFICANT CHANGE UP (ref 0–0.2)
BASOPHILS NFR BLD AUTO: 0.6 % — SIGNIFICANT CHANGE UP (ref 0–1)
BILIRUB SERPL-MCNC: 0.2 MG/DL — SIGNIFICANT CHANGE UP (ref 0.2–1.2)
BILIRUB SERPL-MCNC: 0.3 MG/DL — SIGNIFICANT CHANGE UP (ref 0.2–1.2)
BUN SERPL-MCNC: 16 MG/DL — SIGNIFICANT CHANGE UP (ref 10–20)
BUN SERPL-MCNC: 17 MG/DL — SIGNIFICANT CHANGE UP (ref 10–20)
CALCIUM SERPL-MCNC: 8.8 MG/DL — SIGNIFICANT CHANGE UP (ref 8.4–10.5)
CALCIUM SERPL-MCNC: 8.8 MG/DL — SIGNIFICANT CHANGE UP (ref 8.4–10.5)
CHLORIDE SERPL-SCNC: 108 MMOL/L — SIGNIFICANT CHANGE UP (ref 98–110)
CHLORIDE SERPL-SCNC: 109 MMOL/L — SIGNIFICANT CHANGE UP (ref 98–110)
CO2 SERPL-SCNC: 20 MMOL/L — SIGNIFICANT CHANGE UP (ref 17–32)
CO2 SERPL-SCNC: 23 MMOL/L — SIGNIFICANT CHANGE UP (ref 17–32)
CREAT SERPL-MCNC: 0.6 MG/DL — LOW (ref 0.7–1.5)
CREAT SERPL-MCNC: 0.7 MG/DL — SIGNIFICANT CHANGE UP (ref 0.7–1.5)
EGFR: 78 ML/MIN/1.73M2 — SIGNIFICANT CHANGE UP
EGFR: 81 ML/MIN/1.73M2 — SIGNIFICANT CHANGE UP
EOSINOPHIL # BLD AUTO: 0.15 K/UL — SIGNIFICANT CHANGE UP (ref 0–0.7)
EOSINOPHIL NFR BLD AUTO: 3 % — SIGNIFICANT CHANGE UP (ref 0–8)
GLUCOSE SERPL-MCNC: 70 MG/DL — SIGNIFICANT CHANGE UP (ref 70–99)
GLUCOSE SERPL-MCNC: 78 MG/DL — SIGNIFICANT CHANGE UP (ref 70–99)
HCT VFR BLD CALC: 36.2 % — LOW (ref 37–47)
HGB BLD-MCNC: 12.5 G/DL — SIGNIFICANT CHANGE UP (ref 12–16)
IMM GRANULOCYTES NFR BLD AUTO: 0.2 % — SIGNIFICANT CHANGE UP (ref 0.1–0.3)
LYMPHOCYTES # BLD AUTO: 0.88 K/UL — LOW (ref 1.2–3.4)
LYMPHOCYTES # BLD AUTO: 17.7 % — LOW (ref 20.5–51.1)
MAGNESIUM SERPL-MCNC: 1.9 MG/DL — SIGNIFICANT CHANGE UP (ref 1.8–2.4)
MCHC RBC-ENTMCNC: 32.6 PG — HIGH (ref 27–31)
MCHC RBC-ENTMCNC: 34.5 G/DL — SIGNIFICANT CHANGE UP (ref 32–37)
MCV RBC AUTO: 94.3 FL — SIGNIFICANT CHANGE UP (ref 81–99)
MONOCYTES # BLD AUTO: 0.35 K/UL — SIGNIFICANT CHANGE UP (ref 0.1–0.6)
MONOCYTES NFR BLD AUTO: 7 % — SIGNIFICANT CHANGE UP (ref 1.7–9.3)
NEUTROPHILS # BLD AUTO: 3.55 K/UL — SIGNIFICANT CHANGE UP (ref 1.4–6.5)
NEUTROPHILS NFR BLD AUTO: 71.5 % — SIGNIFICANT CHANGE UP (ref 42.2–75.2)
NRBC # BLD: 0 /100 WBCS — SIGNIFICANT CHANGE UP (ref 0–0)
PLATELET # BLD AUTO: 156 K/UL — SIGNIFICANT CHANGE UP (ref 130–400)
POTASSIUM SERPL-MCNC: 3.7 MMOL/L — SIGNIFICANT CHANGE UP (ref 3.5–5)
POTASSIUM SERPL-MCNC: 3.9 MMOL/L — SIGNIFICANT CHANGE UP (ref 3.5–5)
POTASSIUM SERPL-SCNC: 3.7 MMOL/L — SIGNIFICANT CHANGE UP (ref 3.5–5)
POTASSIUM SERPL-SCNC: 3.9 MMOL/L — SIGNIFICANT CHANGE UP (ref 3.5–5)
PROT SERPL-MCNC: 4.9 G/DL — LOW (ref 6–8)
PROT SERPL-MCNC: 5.1 G/DL — LOW (ref 6–8)
RBC # BLD: 3.84 M/UL — LOW (ref 4.2–5.4)
RBC # FLD: 12.9 % — SIGNIFICANT CHANGE UP (ref 11.5–14.5)
SODIUM SERPL-SCNC: 140 MMOL/L — SIGNIFICANT CHANGE UP (ref 135–146)
SODIUM SERPL-SCNC: 142 MMOL/L — SIGNIFICANT CHANGE UP (ref 135–146)
TROPONIN T SERPL-MCNC: 0.03 NG/ML — CRITICAL HIGH
TROPONIN T SERPL-MCNC: 0.03 NG/ML — CRITICAL HIGH
WBC # BLD: 4.97 K/UL — SIGNIFICANT CHANGE UP (ref 4.8–10.8)
WBC # FLD AUTO: 4.97 K/UL — SIGNIFICANT CHANGE UP (ref 4.8–10.8)

## 2023-02-01 PROCEDURE — 99232 SBSQ HOSP IP/OBS MODERATE 35: CPT

## 2023-02-01 RX ADMIN — REMDESIVIR 200 MILLIGRAM(S): 5 INJECTION INTRAVENOUS at 11:20

## 2023-02-01 RX ADMIN — POLYETHYLENE GLYCOL 3350 17 GRAM(S): 17 POWDER, FOR SOLUTION ORAL at 17:31

## 2023-02-01 RX ADMIN — SENNA PLUS 2 TABLET(S): 8.6 TABLET ORAL at 22:17

## 2023-02-01 RX ADMIN — SODIUM CHLORIDE 500 MILLILITER(S): 9 INJECTION, SOLUTION INTRAVENOUS at 00:00

## 2023-02-01 RX ADMIN — OXYCODONE HYDROCHLORIDE 2.5 MILLIGRAM(S): 5 TABLET ORAL at 22:17

## 2023-02-01 RX ADMIN — POLYETHYLENE GLYCOL 3350 17 GRAM(S): 17 POWDER, FOR SOLUTION ORAL at 05:44

## 2023-02-01 RX ADMIN — ENOXAPARIN SODIUM 40 MILLIGRAM(S): 100 INJECTION SUBCUTANEOUS at 22:18

## 2023-02-01 RX ADMIN — OLANZAPINE 2.5 MILLIGRAM(S): 15 TABLET, FILM COATED ORAL at 11:47

## 2023-02-01 RX ADMIN — OXYCODONE HYDROCHLORIDE 2.5 MILLIGRAM(S): 5 TABLET ORAL at 23:17

## 2023-02-01 NOTE — PROGRESS NOTE ADULT - SUBJECTIVE AND OBJECTIVE BOX
REGAN ABARCA 89y Female  MRN#: [id number]   Hospital Day: 3d    HPI:  89-year-old male with a past medical history significant for bladder cancer (no intervention as per family), lewy body dementia?, aortic valve replacement, who presents with change in mental status. Son states that over the last few days she has been somewhat more confused, imagining that she has been talking with people that aren't there. Son states that she has had this in the past when she's had UTI's and been dehydrated. She has also been complaining of dysuria.  As per son, he states that last year when she was admitted to [facility] in [place] for a UTI they did some head imaging at told him that she had lewy body dementia.     In the ED, pt given aztreonam, fluids  Vitals: Vital Signs Last 24 Hrs  T(C): 36.9 (29 Jan 2023 16:40), Max: 37.1 (29 Jan 2023 11:29)  T(F): 98.5 (29 Jan 2023 16:40), Max: 98.7 (29 Jan 2023 11:29)  HR: 85 (29 Jan 2023 16:40) (85 - 85)  BP: 134/82 (29 Jan 2023 11:29) (134/82 - 134/82)  BP(mean): --  RR: 18 (29 Jan 2023 16:40) (18 - 18)  SpO2: 96% (29 Jan 2023 16:40) (96% - 97%)    Parameters below as of 29 Jan 2023 11:29  Patient On (Oxygen Delivery Method): room air    Labs: UA with 12 wbc,few LE  Imaging:  < from: CT Abdomen and Pelvis w/ IV Cont (01.29.23 @ 14:33) >  MPRESSION:      Previously noted 4 mm calculus in the right renal pelvis is no longer seen    No evidence of hydroureter or hydronephrosis    Bilateral nonobstructing renal calculi measuring up to 3 mm    Large amount of stool in the colon suspicious for constipation    < end of copied text >  < from: CT Head No Cont (01.29.23 @ 13:04) >  IMPRESSION:  No evidence of acute transcortical infarct, acute intracranial   hemorrhage, or mass effect.    < end of copied text >   (29 Jan 2023 18:02)      SUBJECTIVE  Patient is a 89y old Female who presents with a chief complaint of COVID (30 Jan 2023 07:30)  Currently admitted to medicine with the primary diagnosis of Urinary tract infection      INTERVAL HPI AND OVERNIGHT EVENTS:  Patient was examined and seen at bedside. This morning she is confused but AAOx3. She complains of not eating but she does eat as per nurse.     OBJECTIVE  PAST MEDICAL & SURGICAL HISTORY  Bladder cancer    Hard of hearing    History of bowel resection    Heart valve replaced      ALLERGIES:  penicillin (Unknown)    MEDICATIONS:  STANDING MEDICATIONS  enoxaparin Injectable 40 milliGRAM(s) SubCutaneous every 24 hours  lactated ringers. 1000 milliLiter(s) IV Continuous <Continuous>  OLANZapine 2.5 milliGRAM(s) Oral daily  polyethylene glycol 3350 17 Gram(s) Oral two times a day  remdesivir  IVPB 100 milliGRAM(s) IV Intermittent every 24 hours  senna 2 Tablet(s) Oral at bedtime    PRN MEDICATIONS  acetaminophen     Tablet .. 650 milliGRAM(s) Oral every 6 hours PRN  haloperidol    Injectable 2 milliGRAM(s) IntraMuscular every 6 hours PRN  oxyCODONE    IR 2.5 milliGRAM(s) Oral every 4 hours PRN      VITAL SIGNS: Last 24 Hours  T(C): 36.7 (01 Feb 2023 08:37), Max: 36.7 (01 Feb 2023 08:37)  T(F): 98.1 (01 Feb 2023 08:37), Max: 98.1 (01 Feb 2023 08:37)  HR: 97 (01 Feb 2023 08:37) (72 - 97)  BP: 107/53 (01 Feb 2023 08:37) (90/51 - 136/62)  BP(mean): --  RR: 18 (01 Feb 2023 08:37) (17 - 18)  SpO2: 96% (31 Jan 2023 21:00) (96% - 97%)    LABS:                        12.5   4.97  )-----------( 156      ( 01 Feb 2023 06:41 )             36.2     02-01    142  |  108  |  16  ----------------------------<  70  3.7   |  23  |  0.6<L>    Ca    8.8      01 Feb 2023 06:41  Mg     1.9     02-01    TPro  4.9<L>  /  Alb  3.2<L>  /  TBili  0.3  /  DBili  x   /  AST  24  /  ALT  11  /  AlkPhos  72  02-01          Troponin T, Serum: 0.03 ng/mL *HH* (02-01-23 @ 06:41)  Troponin T, Serum: 0.03 ng/mL *HH* (01-31-23 @ 22:28)      Culture - Urine (collected 29 Jan 2023 14:52)  Source: Clean Catch Clean Catch (Midstream)  Final Report (30 Jan 2023 20:45):    <10,000 CFU/mL Normal Urogenital Henrique    Culture - Blood (collected 29 Jan 2023 12:40)  Source: .Blood Blood-Peripheral  Preliminary Report (30 Jan 2023 22:03):    No growth to date.    Culture - Blood (collected 29 Jan 2023 12:40)  Source: .Blood Blood-Peripheral  Preliminary Report (30 Jan 2023 22:03):    No growth to date.      CARDIAC MARKERS ( 01 Feb 2023 06:41 )  x     / 0.03 ng/mL / x     / x     / x      CARDIAC MARKERS ( 31 Jan 2023 22:28 )  x     / 0.03 ng/mL / x     / x     / x          RADIOLOGY:      PHYSICAL EXAM:  CONSTITUTIONAL: No acute distress, AAOx3  HEAD: Atraumatic, normocephalic  EYES: EOM intact, PERRLA, conjunctiva and sclera clear  ENT: Supple, no masses, no thyromegaly, no bruits, no JVD; moist mucous membranes  PULMONARY: Clear to auscultation bilaterally; no wheezes, rales, or rhonchi  CARDIOVASCULAR: Regular rate and rhythm; no murmurs, rubs, or gallops  GASTROINTESTINAL: Soft, non-tender, non-distended; bowel sounds present  MUSCULOSKELETAL: 2+ peripheral pulses; no clubbing, no cyanosis, no edema  NEUROLOGY: non-focal    ASSESSMENT & PLAN  89-year-old male with a past medical history significant for bladder cancer (no intervention as per family), lewy body dementia?, aortic valve replacement, who presents with change in mental status.    1. Metabolic encephalopathy secondary to COVID . UTI was ruled out,  hx of dementia   pt has had these episodes of confusion/hallucinations in the past, son states that he was told last year that she has lewy body dementia  currently AAO3 to name and date, otherwise no focal deficits  - CTH:WNL   - Was on Aztreonam but stopped   - Urine cx: nl henrique   - Keep gentle hydration  - PT eval appreciated     2. Hx of bladder cancer   - No intervention as per family     3. Aortic valve replacement   - not on aspirin     4. Constipation  - on laxatives    DVT ppx: lovenox  Diet: reg   Pending: DC planning

## 2023-02-01 NOTE — PROGRESS NOTE ADULT - SUBJECTIVE AND OBJECTIVE BOX
SUBJECTIVE:    Patient is a 89y old Female who presents with a chief complaint of COVID (30 Jan 2023 07:30)    Currently admitted to medicine with the primary diagnosis of Urinary tract infection       Today is hospital day 3d.     PAST MEDICAL & SURGICAL HISTORY  Bladder cancer    Hard of hearing    History of bowel resection    Heart valve replaced      ALLERGIES:  penicillin (Unknown)    MEDICATIONS:  STANDING MEDICATIONS  enoxaparin Injectable 40 milliGRAM(s) SubCutaneous every 24 hours  lactated ringers. 1000 milliLiter(s) IV Continuous <Continuous>  polyethylene glycol 3350 17 Gram(s) Oral two times a day  senna 2 Tablet(s) Oral at bedtime    PRN MEDICATIONS  acetaminophen     Tablet .. 650 milliGRAM(s) Oral every 6 hours PRN  haloperidol    Injectable 2 milliGRAM(s) IntraMuscular every 6 hours PRN  oxyCODONE    IR 2.5 milliGRAM(s) Oral every 4 hours PRN    VITALS:   T(F): 98.1  HR: 97  BP: 107/53  RR: 18  SpO2: 96%    LABS:                        12.5   4.97  )-----------( 156      ( 01 Feb 2023 06:41 )             36.2     02-01    142  |  108  |  16  ----------------------------<  70  3.7   |  23  |  0.6<L>    Ca    8.8      01 Feb 2023 06:41  Mg     1.9     02-01    TPro  4.9<L>  /  Alb  3.2<L>  /  TBili  0.3  /  DBili  x   /  AST  24  /  ALT  11  /  AlkPhos  72  02-01          Troponin T, Serum: 0.03 ng/mL *HH* (02-01-23 @ 06:41)  Troponin T, Serum: 0.03 ng/mL *HH* (01-31-23 @ 22:28)      Culture - Urine (collected 29 Jan 2023 14:52)  Source: Clean Catch Clean Catch (Midstream)  Final Report (30 Jan 2023 20:45):    <10,000 CFU/mL Normal Urogenital Lucrecia      CARDIAC MARKERS ( 01 Feb 2023 06:41 )  x     / 0.03 ng/mL / x     / x     / x      CARDIAC MARKERS ( 31 Jan 2023 22:28 )  x     / 0.03 ng/mL / x     / x     / x          RADIOLOGY:    PHYSICAL EXAM:  GEN: No acute distress  LUNGS: Clear to auscultation bilaterally   HEART: S1/S2 present. RRR.   ABD/ GI: Soft, non-tender, non-distended. Bowel sounds present  EXT: NC/NC/NE/2+PP/POTTS  NEURO: AAOX3     SUBJECTIVE:    Patient is a 89y old Female who presents with a chief complaint of COVID (30 Jan 2023 07:30)    Currently admitted to medicine with the primary diagnosis of Urinary tract infection       Today is hospital day 3d.     PAST MEDICAL & SURGICAL HISTORY  Bladder cancer    Hard of hearing    History of bowel resection    Heart valve replaced      ALLERGIES:  penicillin (Unknown)    MEDICATIONS:  STANDING MEDICATIONS  enoxaparin Injectable 40 milliGRAM(s) SubCutaneous every 24 hours  lactated ringers. 1000 milliLiter(s) IV Continuous <Continuous>  polyethylene glycol 3350 17 Gram(s) Oral two times a day  senna 2 Tablet(s) Oral at bedtime    PRN MEDICATIONS  acetaminophen     Tablet .. 650 milliGRAM(s) Oral every 6 hours PRN  haloperidol    Injectable 2 milliGRAM(s) IntraMuscular every 6 hours PRN  oxyCODONE    IR 2.5 milliGRAM(s) Oral every 4 hours PRN    VITALS:   T(F): 98.1  HR: 97  BP: 107/53  RR: 18  SpO2: 96%    LABS:                        12.5   4.97  )-----------( 156      ( 01 Feb 2023 06:41 )             36.2     02-01    142  |  108  |  16  ----------------------------<  70  3.7   |  23  |  0.6<L>    Ca    8.8      01 Feb 2023 06:41  Mg     1.9     02-01    TPro  4.9<L>  /  Alb  3.2<L>  /  TBili  0.3  /  DBili  x   /  AST  24  /  ALT  11  /  AlkPhos  72  02-01          Troponin T, Serum: 0.03 ng/mL *HH* (02-01-23 @ 06:41)  Troponin T, Serum: 0.03 ng/mL *HH* (01-31-23 @ 22:28)      Culture - Urine (collected 29 Jan 2023 14:52)  Source: Clean Catch Clean Catch (Midstream)  Final Report (30 Jan 2023 20:45):    <10,000 CFU/mL Normal Urogenital Lucrecia      CARDIAC MARKERS ( 01 Feb 2023 06:41 )  x     / 0.03 ng/mL / x     / x     / x      CARDIAC MARKERS ( 31 Jan 2023 22:28 )  x     / 0.03 ng/mL / x     / x     / x          RADIOLOGY:    PHYSICAL EXAM:  GEN: No acute distress  LUNGS: Clear to auscultation bilaterally   HEART: S1/S2 present. RRR.   ABD/ GI: Soft, non-tender, non-distended. Bowel sounds present  EXT: NC/NC/NE/2+PP/POTTS  NEURO: Awake and alert

## 2023-02-02 LAB
ALBUMIN SERPL ELPH-MCNC: 3 G/DL — LOW (ref 3.5–5.2)
ALP SERPL-CCNC: 72 U/L — SIGNIFICANT CHANGE UP (ref 30–115)
ALT FLD-CCNC: 10 U/L — SIGNIFICANT CHANGE UP (ref 0–41)
ANION GAP SERPL CALC-SCNC: 9 MMOL/L — SIGNIFICANT CHANGE UP (ref 7–14)
AST SERPL-CCNC: 19 U/L — SIGNIFICANT CHANGE UP (ref 0–41)
BASOPHILS # BLD AUTO: 0.03 K/UL — SIGNIFICANT CHANGE UP (ref 0–0.2)
BASOPHILS NFR BLD AUTO: 0.6 % — SIGNIFICANT CHANGE UP (ref 0–1)
BILIRUB SERPL-MCNC: 0.4 MG/DL — SIGNIFICANT CHANGE UP (ref 0.2–1.2)
BUN SERPL-MCNC: 29 MG/DL — HIGH (ref 10–20)
CALCIUM SERPL-MCNC: 8.8 MG/DL — SIGNIFICANT CHANGE UP (ref 8.4–10.4)
CHLORIDE SERPL-SCNC: 106 MMOL/L — SIGNIFICANT CHANGE UP (ref 98–110)
CO2 SERPL-SCNC: 26 MMOL/L — SIGNIFICANT CHANGE UP (ref 17–32)
CREAT SERPL-MCNC: 0.7 MG/DL — SIGNIFICANT CHANGE UP (ref 0.7–1.5)
EGFR: 78 ML/MIN/1.73M2 — SIGNIFICANT CHANGE UP
EOSINOPHIL # BLD AUTO: 0.09 K/UL — SIGNIFICANT CHANGE UP (ref 0–0.7)
EOSINOPHIL NFR BLD AUTO: 1.7 % — SIGNIFICANT CHANGE UP (ref 0–8)
GLUCOSE SERPL-MCNC: 53 MG/DL — LOW (ref 70–99)
HCT VFR BLD CALC: 38 % — SIGNIFICANT CHANGE UP (ref 37–47)
HGB BLD-MCNC: 12.6 G/DL — SIGNIFICANT CHANGE UP (ref 12–16)
IMM GRANULOCYTES NFR BLD AUTO: 0.2 % — SIGNIFICANT CHANGE UP (ref 0.1–0.3)
LYMPHOCYTES # BLD AUTO: 0.86 K/UL — LOW (ref 1.2–3.4)
LYMPHOCYTES # BLD AUTO: 16.3 % — LOW (ref 20.5–51.1)
MAGNESIUM SERPL-MCNC: 2 MG/DL — SIGNIFICANT CHANGE UP (ref 1.8–2.4)
MCHC RBC-ENTMCNC: 31.7 PG — HIGH (ref 27–31)
MCHC RBC-ENTMCNC: 33.2 G/DL — SIGNIFICANT CHANGE UP (ref 32–37)
MCV RBC AUTO: 95.7 FL — SIGNIFICANT CHANGE UP (ref 81–99)
MONOCYTES # BLD AUTO: 0.38 K/UL — SIGNIFICANT CHANGE UP (ref 0.1–0.6)
MONOCYTES NFR BLD AUTO: 7.2 % — SIGNIFICANT CHANGE UP (ref 1.7–9.3)
NEUTROPHILS # BLD AUTO: 3.89 K/UL — SIGNIFICANT CHANGE UP (ref 1.4–6.5)
NEUTROPHILS NFR BLD AUTO: 74 % — SIGNIFICANT CHANGE UP (ref 42.2–75.2)
NRBC # BLD: 0 /100 WBCS — SIGNIFICANT CHANGE UP (ref 0–0)
PLATELET # BLD AUTO: 198 K/UL — SIGNIFICANT CHANGE UP (ref 130–400)
POTASSIUM SERPL-MCNC: 3.7 MMOL/L — SIGNIFICANT CHANGE UP (ref 3.5–5)
POTASSIUM SERPL-SCNC: 3.7 MMOL/L — SIGNIFICANT CHANGE UP (ref 3.5–5)
PROT SERPL-MCNC: 5.1 G/DL — LOW (ref 6–8)
RBC # BLD: 3.97 M/UL — LOW (ref 4.2–5.4)
RBC # FLD: 12.6 % — SIGNIFICANT CHANGE UP (ref 11.5–14.5)
SODIUM SERPL-SCNC: 141 MMOL/L — SIGNIFICANT CHANGE UP (ref 135–146)
WBC # BLD: 5.26 K/UL — SIGNIFICANT CHANGE UP (ref 4.8–10.8)
WBC # FLD AUTO: 5.26 K/UL — SIGNIFICANT CHANGE UP (ref 4.8–10.8)

## 2023-02-02 PROCEDURE — 99231 SBSQ HOSP IP/OBS SF/LOW 25: CPT

## 2023-02-02 RX ORDER — IBUPROFEN 200 MG
400 TABLET ORAL ONCE
Refills: 0 | Status: COMPLETED | OUTPATIENT
Start: 2023-02-02 | End: 2023-02-02

## 2023-02-02 RX ADMIN — POLYETHYLENE GLYCOL 3350 17 GRAM(S): 17 POWDER, FOR SOLUTION ORAL at 17:00

## 2023-02-02 RX ADMIN — POLYETHYLENE GLYCOL 3350 17 GRAM(S): 17 POWDER, FOR SOLUTION ORAL at 05:24

## 2023-02-02 NOTE — PROGRESS NOTE ADULT - SUBJECTIVE AND OBJECTIVE BOX
SUBJECTIVE:    Patient is a 89y old Female who presents with a chief complaint of COVID (30 Jan 2023 07:30)    Currently admitted to medicine with the primary diagnosis of Urinary tract infection       Today is hospital day 4d.     PAST MEDICAL & SURGICAL HISTORY  Bladder cancer    Hard of hearing    History of bowel resection    Heart valve replaced      ALLERGIES:  penicillin (Unknown)    MEDICATIONS:  STANDING MEDICATIONS  enoxaparin Injectable 40 milliGRAM(s) SubCutaneous every 24 hours  ibuprofen  Tablet. 400 milliGRAM(s) Oral once  lactated ringers. 1000 milliLiter(s) IV Continuous <Continuous>  polyethylene glycol 3350 17 Gram(s) Oral two times a day  senna 2 Tablet(s) Oral at bedtime    PRN MEDICATIONS  acetaminophen     Tablet .. 650 milliGRAM(s) Oral every 6 hours PRN  haloperidol    Injectable 2 milliGRAM(s) IntraMuscular every 6 hours PRN  oxyCODONE    IR 2.5 milliGRAM(s) Oral every 4 hours PRN    VITALS:   T(F): 98  HR: 85  BP: 103/52  RR: 18  SpO2: 99%    LABS:                        12.6   5.26  )-----------( 198      ( 02 Feb 2023 06:30 )             38.0     02-02    141  |  106  |  29<H>  ----------------------------<  53<L>  3.7   |  26  |  0.7    Ca    8.8      02 Feb 2023 06:30  Mg     2.0     02-02    TPro  5.1<L>  /  Alb  3.0<L>  /  TBili  0.4  /  DBili  x   /  AST  19  /  ALT  10  /  AlkPhos  72  02-02              CARDIAC MARKERS ( 01 Feb 2023 06:41 )  x     / 0.03 ng/mL / x     / x     / x      CARDIAC MARKERS ( 31 Jan 2023 22:28 )  x     / 0.03 ng/mL / x     / x     / x          RADIOLOGY:    PHYSICAL EXAM:  GEN: No acute distress  LUNGS: Clear to auscultation bilaterally   HEART: S1/S2 present. RRR.   ABD/ GI: Soft, non-tender, non-distended. Bowel sounds present  EXT: NC/NC/NE/2+PP/POTTS  NEURO: AAOX3

## 2023-02-02 NOTE — PROGRESS NOTE ADULT - SUBJECTIVE AND OBJECTIVE BOX
REGAN ABARCA 89y Female  MRN#: [id number]   Hospital Day: 4d    HPI:  89-year-old male with a past medical history significant for bladder cancer (no intervention as per family), lewy body dementia?, aortic valve replacement, who presents with change in mental status. Son states that over the last few days she has been somewhat more confused, imagining that she has been talking with people that aren't there. Son states that she has had this in the past when she's had UTI's and been dehydrated. She has also been complaining of dysuria.  As per son, he states that last year when she was admitted to [facility] in [place] for a UTI they did some head imaging at told him that she had lewy body dementia.     In the ED, pt given aztreonam, fluids  Vitals: Vital Signs Last 24 Hrs  T(C): 36.9 (29 Jan 2023 16:40), Max: 37.1 (29 Jan 2023 11:29)  T(F): 98.5 (29 Jan 2023 16:40), Max: 98.7 (29 Jan 2023 11:29)  HR: 85 (29 Jan 2023 16:40) (85 - 85)  BP: 134/82 (29 Jan 2023 11:29) (134/82 - 134/82)  BP(mean): --  RR: 18 (29 Jan 2023 16:40) (18 - 18)  SpO2: 96% (29 Jan 2023 16:40) (96% - 97%)    Parameters below as of 29 Jan 2023 11:29  Patient On (Oxygen Delivery Method): room air    Labs: UA with 12 wbc,few LE  Imaging:  < from: CT Abdomen and Pelvis w/ IV Cont (01.29.23 @ 14:33) >  MPRESSION:      Previously noted 4 mm calculus in the right renal pelvis is no longer seen    No evidence of hydroureter or hydronephrosis    Bilateral nonobstructing renal calculi measuring up to 3 mm    Large amount of stool in the colon suspicious for constipation    < end of copied text >  < from: CT Head No Cont (01.29.23 @ 13:04) >  IMPRESSION:  No evidence of acute transcortical infarct, acute intracranial   hemorrhage, or mass effect.    < end of copied text >   (29 Jan 2023 18:02)      SUBJECTIVE  Patient is a 89y old Female who presents with a chief complaint of COVID (30 Jan 2023 07:30)  Currently admitted to medicine with the primary diagnosis of Urinary tract infection      INTERVAL HPI AND OVERNIGHT EVENTS:  Patient was examined and seen at bedside. This morning she is resting comfortably in bed and reports no issues or overnight events.    OBJECTIVE  PAST MEDICAL & SURGICAL HISTORY  Bladder cancer    Hard of hearing    History of bowel resection    Heart valve replaced      ALLERGIES:  penicillin (Unknown)    MEDICATIONS:  STANDING MEDICATIONS  enoxaparin Injectable 40 milliGRAM(s) SubCutaneous every 24 hours  ibuprofen  Tablet. 400 milliGRAM(s) Oral once  lactated ringers. 1000 milliLiter(s) IV Continuous <Continuous>  polyethylene glycol 3350 17 Gram(s) Oral two times a day  senna 2 Tablet(s) Oral at bedtime    PRN MEDICATIONS  acetaminophen     Tablet .. 650 milliGRAM(s) Oral every 6 hours PRN  haloperidol    Injectable 2 milliGRAM(s) IntraMuscular every 6 hours PRN  oxyCODONE    IR 2.5 milliGRAM(s) Oral every 4 hours PRN      VITAL SIGNS: Last 24 Hours  T(C): 36.7 (02 Feb 2023 09:00), Max: 36.7 (01 Feb 2023 15:30)  T(F): 98 (02 Feb 2023 09:00), Max: 98.1 (01 Feb 2023 15:30)  HR: 85 (02 Feb 2023 10:08) (73 - 85)  BP: 103/52 (02 Feb 2023 10:08) (99/44 - 124/55)  BP(mean): --  RR: 18 (02 Feb 2023 09:00) (18 - 18)  SpO2: 99% (02 Feb 2023 09:00) (95% - 99%)    LABS:                        12.6   5.26  )-----------( 198      ( 02 Feb 2023 06:30 )             38.0     02-02    141  |  106  |  29<H>  ----------------------------<  53<L>  3.7   |  26  |  0.7    Ca    8.8      02 Feb 2023 06:30  Mg     2.0     02-02    TPro  5.1<L>  /  Alb  3.0<L>  /  TBili  0.4  /  DBili  x   /  AST  19  /  ALT  10  /  AlkPhos  72  02-02              CARDIAC MARKERS ( 01 Feb 2023 06:41 )  x     / 0.03 ng/mL / x     / x     / x      CARDIAC MARKERS ( 31 Jan 2023 22:28 )  x     / 0.03 ng/mL / x     / x     / x          RADIOLOGY:      PHYSICAL EXAM:  CONSTITUTIONAL: No acute distress, AAOx3  HEAD: Atraumatic, normocephalic  EYES: EOM intact, PERRLA, conjunctiva and sclera clear  ENT: Supple, no masses, no thyromegaly, no bruits, no JVD; moist mucous membranes  PULMONARY: Clear to auscultation bilaterally; no wheezes, rales, or rhonchi  CARDIOVASCULAR: Regular rate and rhythm; no murmurs, rubs, or gallops  GASTROINTESTINAL: Soft, non-tender, non-distended; bowel sounds present  MUSCULOSKELETAL: 2+ peripheral pulses; no clubbing, no cyanosis, no edema  NEUROLOGY: non-focal    ASSESSMENT & PLAN  89-year-old male with a past medical history significant for bladder cancer (no intervention as per family), lewy body dementia?, aortic valve replacement, who presents with change in mental status.    1. Metabolic encephalopathy secondary to COVID . UTI was ruled out,  hx of dementia   pt has had these episodes of confusion/hallucinations in the past, son states that he was told last year that she has lewy body dementia  currently AAO3 to name and date, otherwise no focal deficits  - CTH:WNL   - Was on Aztreonam but stopped   - Urine cx: nl henrique   - Keep gentle hydration  - PT eval appreciated     2. Hx of bladder cancer   - No intervention as per family     3. Aortic valve replacement   - not on aspirin     4. Constipation  - on laxatives    DVT ppx: lovenox  Diet: reg   Pending: DC planning

## 2023-02-03 VITALS
TEMPERATURE: 98 F | HEART RATE: 86 BPM | RESPIRATION RATE: 20 BRPM | DIASTOLIC BLOOD PRESSURE: 64 MMHG | SYSTOLIC BLOOD PRESSURE: 146 MMHG

## 2023-02-03 LAB
ALBUMIN SERPL ELPH-MCNC: 3.3 G/DL — LOW (ref 3.5–5.2)
ALP SERPL-CCNC: 74 U/L — SIGNIFICANT CHANGE UP (ref 30–115)
ALT FLD-CCNC: 10 U/L — SIGNIFICANT CHANGE UP (ref 0–41)
ANION GAP SERPL CALC-SCNC: 8 MMOL/L — SIGNIFICANT CHANGE UP (ref 7–14)
AST SERPL-CCNC: 18 U/L — SIGNIFICANT CHANGE UP (ref 0–41)
BASOPHILS # BLD AUTO: 0.02 K/UL — SIGNIFICANT CHANGE UP (ref 0–0.2)
BASOPHILS NFR BLD AUTO: 0.4 % — SIGNIFICANT CHANGE UP (ref 0–1)
BILIRUB SERPL-MCNC: 0.3 MG/DL — SIGNIFICANT CHANGE UP (ref 0.2–1.2)
BUN SERPL-MCNC: 32 MG/DL — HIGH (ref 10–20)
CALCIUM SERPL-MCNC: 9 MG/DL — SIGNIFICANT CHANGE UP (ref 8.4–10.5)
CHLORIDE SERPL-SCNC: 106 MMOL/L — SIGNIFICANT CHANGE UP (ref 98–110)
CO2 SERPL-SCNC: 26 MMOL/L — SIGNIFICANT CHANGE UP (ref 17–32)
CREAT SERPL-MCNC: 0.6 MG/DL — LOW (ref 0.7–1.5)
CULTURE RESULTS: SIGNIFICANT CHANGE UP
CULTURE RESULTS: SIGNIFICANT CHANGE UP
EGFR: 81 ML/MIN/1.73M2 — SIGNIFICANT CHANGE UP
EOSINOPHIL # BLD AUTO: 0.06 K/UL — SIGNIFICANT CHANGE UP (ref 0–0.7)
EOSINOPHIL NFR BLD AUTO: 1.1 % — SIGNIFICANT CHANGE UP (ref 0–8)
GLUCOSE SERPL-MCNC: 153 MG/DL — HIGH (ref 70–99)
HCT VFR BLD CALC: 35.1 % — LOW (ref 37–47)
HGB BLD-MCNC: 12 G/DL — SIGNIFICANT CHANGE UP (ref 12–16)
IMM GRANULOCYTES NFR BLD AUTO: 0.4 % — HIGH (ref 0.1–0.3)
LYMPHOCYTES # BLD AUTO: 0.9 K/UL — LOW (ref 1.2–3.4)
LYMPHOCYTES # BLD AUTO: 15.8 % — LOW (ref 20.5–51.1)
MAGNESIUM SERPL-MCNC: 2 MG/DL — SIGNIFICANT CHANGE UP (ref 1.8–2.4)
MCHC RBC-ENTMCNC: 31.7 PG — HIGH (ref 27–31)
MCHC RBC-ENTMCNC: 34.2 G/DL — SIGNIFICANT CHANGE UP (ref 32–37)
MCV RBC AUTO: 92.6 FL — SIGNIFICANT CHANGE UP (ref 81–99)
MONOCYTES # BLD AUTO: 0.41 K/UL — SIGNIFICANT CHANGE UP (ref 0.1–0.6)
MONOCYTES NFR BLD AUTO: 7.2 % — SIGNIFICANT CHANGE UP (ref 1.7–9.3)
NEUTROPHILS # BLD AUTO: 4.29 K/UL — SIGNIFICANT CHANGE UP (ref 1.4–6.5)
NEUTROPHILS NFR BLD AUTO: 75.1 % — SIGNIFICANT CHANGE UP (ref 42.2–75.2)
NRBC # BLD: 0 /100 WBCS — SIGNIFICANT CHANGE UP (ref 0–0)
PLATELET # BLD AUTO: 169 K/UL — SIGNIFICANT CHANGE UP (ref 130–400)
POTASSIUM SERPL-MCNC: 3.8 MMOL/L — SIGNIFICANT CHANGE UP (ref 3.5–5)
POTASSIUM SERPL-SCNC: 3.8 MMOL/L — SIGNIFICANT CHANGE UP (ref 3.5–5)
PROT SERPL-MCNC: 5.1 G/DL — LOW (ref 6–8)
RBC # BLD: 3.79 M/UL — LOW (ref 4.2–5.4)
RBC # FLD: 12.7 % — SIGNIFICANT CHANGE UP (ref 11.5–14.5)
SODIUM SERPL-SCNC: 140 MMOL/L — SIGNIFICANT CHANGE UP (ref 135–146)
SPECIMEN SOURCE: SIGNIFICANT CHANGE UP
SPECIMEN SOURCE: SIGNIFICANT CHANGE UP
WBC # BLD: 5.7 K/UL — SIGNIFICANT CHANGE UP (ref 4.8–10.8)
WBC # FLD AUTO: 5.7 K/UL — SIGNIFICANT CHANGE UP (ref 4.8–10.8)

## 2023-02-03 PROCEDURE — 99239 HOSP IP/OBS DSCHRG MGMT >30: CPT

## 2023-02-03 RX ORDER — SENNA PLUS 8.6 MG/1
2 TABLET ORAL
Qty: 0 | Refills: 0 | DISCHARGE
Start: 2023-02-03

## 2023-02-03 RX ORDER — POLYETHYLENE GLYCOL 3350 17 G/17G
17 POWDER, FOR SOLUTION ORAL
Qty: 0 | Refills: 0 | DISCHARGE
Start: 2023-02-03

## 2023-02-03 RX ORDER — POLYETHYLENE GLYCOL 3350 17 G/17G
17 POWDER, FOR SOLUTION ORAL
Qty: 510 | Refills: 0
Start: 2023-02-03 | End: 2023-03-04

## 2023-02-03 RX ORDER — SENNA PLUS 8.6 MG/1
2 TABLET ORAL
Qty: 60 | Refills: 0
Start: 2023-02-03 | End: 2023-03-04

## 2023-02-03 RX ADMIN — POLYETHYLENE GLYCOL 3350 17 GRAM(S): 17 POWDER, FOR SOLUTION ORAL at 05:19

## 2023-02-03 NOTE — DISCHARGE NOTE PROVIDER - NSDCCPCAREPLAN_GEN_ALL_CORE_FT
PRINCIPAL DISCHARGE DIAGNOSIS  Diagnosis: 2019 novel coronavirus disease (COVID-19)  Assessment and Plan of Treatment: Coronavirus disease 2019 (COVID-19) is a respiratory illness  that can spread from person to person. The virus that causes  COVID-19 is a novel coronavirus that was first identified during  an investigation into an outbreak in [place], [place].  The virus that causes COVID-19 probably emerged from an  animal source, but is now spreading from person to person.  The virus is thought to spread mainly between people who  are in close contact with one another (within about 6 feet)  through respiratory droplets produced when an infected  person coughs or sneezes. It also may be possible that a person  can get COVID-19 by touching a surface or object that has  the virus on it and then touching their own mouth, nose, or  possibly their eyes, but this is not thought to be the main  way the virus spreads.  Please stay home and avoid contact with others for at least a week after symptoms resolve and follow government guidelines.   Patients with COVID-19 have had mild to severe respiratory  illness with symptoms of  • fever  • cough  • shortness of breath  People can help protect themselves from respiratory illness with  everyday preventive actions.    • Avoid close contact with people who are sick.  • Avoid touching your eyes, nose, and mouth with  unwashed hands.  • Wash your hands often with soap and water for at least 20   seconds. Use an alcohol-based hand  that contains at  least 60% alcohol if soap and water are not available.

## 2023-02-03 NOTE — DISCHARGE NOTE NURSING/CASE MANAGEMENT/SOCIAL WORK - PATIENT PORTAL LINK FT
You can access the FollowMyHealth Patient Portal offered by [facility] by registering at the following website: [email or web address]. By joining [employer]’s FollowMyHealth portal, you will also be able to view your health information using other applications (apps) compatible with our system.

## 2023-02-03 NOTE — DISCHARGE NOTE PROVIDER - HOSPITAL COURSE
89-year-old female with a past medical history significant for bladder cancer (no intervention as per family), lewy body dementia?, aortic valve replacement, who presents with change in mental status. Son states that over the last few days she has been somewhat more confused, imagining that she has been talking with people that aren't there. Son states that she has had this in the past when she's had UTI's and been dehydrated. She has also been complaining of dysuria.  As per son, he states that last year when she was admitted to [facility] in [place] for a UTI they did some head imaging at told him that she had lewy body dementia.     In the ED, pt given aztreonam, fluids  Vitals: Vital Signs Last 24 Hrs  T(C): 36.9 (29 Jan 2023 16:40), Max: 37.1 (29 Jan 2023 11:29)  T(F): 98.5 (29 Jan 2023 16:40), Max: 98.7 (29 Jan 2023 11:29)  HR: 85 (29 Jan 2023 16:40) (85 - 85)  BP: 134/82 (29 Jan 2023 11:29) (134/82 - 134/82)  BP(mean): --  RR: 18 (29 Jan 2023 16:40) (18 - 18)  SpO2: 96% (29 Jan 2023 16:40) (96% - 97%)    Parameters below as of 29 Jan 2023 11:29  Patient On (Oxygen Delivery Method): room air    Labs: UA with 12 wbc, few LE      CT Abdomen and Pelvis w/ IV Cont  IMPRESSION:    Previously noted 4 mm calculus in the right renal pelvis is no longer seen    No evidence of hydroureter or hydronephrosis    Bilateral nonobstructing renal calculi measuring up to 3 mm    Large amount of stool in the colon suspicious for constipation      CT Head No Cont  IMPRESSION:  No evidence of acute transcortical infarct, acute intracranial   hemorrhage, or mass effect.

## 2023-02-03 NOTE — PROGRESS NOTE ADULT - SUBJECTIVE AND OBJECTIVE BOX
SUBJECTIVE:    Patient is a 89y old Female who presents with a chief complaint of AMS (03 Feb 2023 10:34)    Currently admitted to medicine with the primary diagnosis of 2019 novel coronavirus disease (COVID-19)       Today is hospital day 5d.     PAST MEDICAL & SURGICAL HISTORY  Bladder cancer    Hard of hearing    History of bowel resection    Heart valve replaced      ALLERGIES:  penicillin (Unknown)    MEDICATIONS:  STANDING MEDICATIONS  enoxaparin Injectable 40 milliGRAM(s) SubCutaneous every 24 hours  polyethylene glycol 3350 17 Gram(s) Oral two times a day  senna 2 Tablet(s) Oral at bedtime    PRN MEDICATIONS  acetaminophen     Tablet .. 650 milliGRAM(s) Oral every 6 hours PRN    VITALS:   T(F): 98.1  HR: 85  BP: 124/75  RR: 18  SpO2: 97%    LABS:                        12.0   5.70  )-----------( 169      ( 03 Feb 2023 06:25 )             35.1     02-03    140  |  106  |  32<H>  ----------------------------<  153<H>  3.8   |  26  |  0.6<L>    Ca    9.0      03 Feb 2023 06:25  Mg     2.0     02-03    TPro  5.1<L>  /  Alb  3.3<L>  /  TBili  0.3  /  DBili  x   /  AST  18  /  ALT  10  /  AlkPhos  74  02-03                  RADIOLOGY:    PHYSICAL EXAM:  GEN: No acute distress  LUNGS: Clear to auscultation bilaterally   HEART: S1/S2 present. RRR.   ABD/ GI: Soft, non-tender, non-distended. Bowel sounds present  EXT: NC/NC/NE/2+PP/POTTS  NEURO: AAOX3

## 2023-02-03 NOTE — DISCHARGE NOTE PROVIDER - CARE PROVIDER_API CALL
Vita Byrne)  Internal Medicine  [address] [place] [address]  Phone: [phone]  Fax: [phone]  Follow Up Time:

## 2023-02-03 NOTE — DISCHARGE NOTE NURSING/CASE MANAGEMENT/SOCIAL WORK - NSDCPEFALRISK_GEN_ALL_CORE
For information on Fall & Injury Prevention, visit: [email or web address] OR  [email or web address] OR  https://www.cdc.gov/steadi/patient.html

## 2023-02-09 DIAGNOSIS — H91.90 UNSPECIFIED HEARING LOSS, UNSPECIFIED EAR: ICD-10-CM

## 2023-02-09 DIAGNOSIS — U07.1 COVID-19: ICD-10-CM

## 2023-02-09 DIAGNOSIS — Z85.51 PERSONAL HISTORY OF MALIGNANT NEOPLASM OF BLADDER: ICD-10-CM

## 2023-02-09 DIAGNOSIS — Z88.0 ALLERGY STATUS TO PENICILLIN: ICD-10-CM

## 2023-02-09 DIAGNOSIS — J12.82 PNEUMONIA DUE TO CORONAVIRUS DISEASE 2019: ICD-10-CM

## 2023-02-09 DIAGNOSIS — F02.80 DEMENTIA IN OTHER DISEASES CLASSIFIED ELSEWHERE, UNSPECIFIED SEVERITY, WITHOUT BEHAVIORAL DISTURBANCE, PSYCHOTIC DISTURBANCE, MOOD DISTURBANCE, AND ANXIETY: ICD-10-CM

## 2023-02-09 DIAGNOSIS — E86.0 DEHYDRATION: ICD-10-CM

## 2023-02-09 DIAGNOSIS — Z87.442 PERSONAL HISTORY OF URINARY CALCULI: ICD-10-CM

## 2023-02-09 DIAGNOSIS — Z95.4 PRESENCE OF OTHER HEART-VALVE REPLACEMENT: ICD-10-CM

## 2023-02-09 DIAGNOSIS — G93.41 METABOLIC ENCEPHALOPATHY: ICD-10-CM

## 2023-02-09 DIAGNOSIS — Z90.49 ACQUIRED ABSENCE OF OTHER SPECIFIED PARTS OF DIGESTIVE TRACT: ICD-10-CM

## 2023-02-09 DIAGNOSIS — K59.00 CONSTIPATION, UNSPECIFIED: ICD-10-CM

## 2023-02-09 DIAGNOSIS — G31.83 NEUROCOGNITIVE DISORDER WITH LEWY BODIES: ICD-10-CM

## 2023-04-07 NOTE — ED PROVIDER NOTE - IV ALTEPLASE EXCL REL HIDDEN
show
No abnormalities/Unable to assess
No abnormalities/Unable to assess
Other/Unable to assess
No abnormalities/Unable to assess
No abnormalities/Unable to assess
Unable to assess

## 2023-04-18 NOTE — ED ADULT TRIAGE NOTE - TEMPERATURE IN CELSIUS (DEGREES C)
37.3 Sotyktu Counseling:  I discussed the most common side effects of Sotyktu including: common cold, sore throat, sinus infections, cold sores, canker sores, folliculitis, and acne.? I also discussed more serious side effects of Sotyktu including but not limited to: serious allergic reactions; increased risk for infections such as TB; cancers such as lymphomas; rhabdomyolysis and elevated CPK; and elevated triglycerides and liver enzymes.?

## 2023-11-03 NOTE — ED ADULT NURSE NOTE - HAVE YOU HAD COVID IN THE LAST 60 DAYS?
Patient in clinic seeing Dr. Marrufo for cardiology appointment.  States waiting  on a call from endocrinology for an appointment, referral paced by PCP.  Does not mind if appointment is at [facility] or [facility], I will forward message to staff at both locations for an appointment, as I was unable to schedule.   
No